# Patient Record
Sex: FEMALE | Race: WHITE | NOT HISPANIC OR LATINO | Employment: STUDENT | URBAN - METROPOLITAN AREA
[De-identification: names, ages, dates, MRNs, and addresses within clinical notes are randomized per-mention and may not be internally consistent; named-entity substitution may affect disease eponyms.]

---
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
## 2024-01-12 ENCOUNTER — APPOINTMENT (OUTPATIENT)
Dept: BEHAVIORAL HEALTH | Facility: CLINIC | Age: 19
End: 2024-01-12
Payer: COMMERCIAL

## 2024-01-17 ENCOUNTER — TELEMEDICINE (OUTPATIENT)
Dept: BEHAVIORAL HEALTH | Facility: CLINIC | Age: 19
End: 2024-01-17
Payer: COMMERCIAL

## 2024-01-17 DIAGNOSIS — F90.0 ATTENTION DEFICIT HYPERACTIVITY DISORDER (ADHD), PREDOMINANTLY INATTENTIVE TYPE: ICD-10-CM

## 2024-01-17 PROCEDURE — 99212 OFFICE O/P EST SF 10 MIN: CPT | Performed by: PSYCHIATRY & NEUROLOGY

## 2024-01-17 RX ORDER — ATOMOXETINE 80 MG/1
80 CAPSULE ORAL DAILY
Qty: 30 CAPSULE | Refills: 2 | Status: SHIPPED | OUTPATIENT
Start: 2024-01-17 | End: 2024-02-20 | Stop reason: SDUPTHER

## 2024-01-17 RX ORDER — DEXTROAMPHETAMINE SACCHARATE, AMPHETAMINE ASPARTATE, DEXTROAMPHETAMINE SULFATE AND AMPHETAMINE SULFATE 1.25; 1.25; 1.25; 1.25 MG/1; MG/1; MG/1; MG/1
5 TABLET ORAL 2 TIMES DAILY
Qty: 60 TABLET | Refills: 0 | Status: SHIPPED | OUTPATIENT
Start: 2024-01-17 | End: 2024-02-20 | Stop reason: SDUPTHER

## 2024-01-17 ASSESSMENT — ENCOUNTER SYMPTOMS: DECREASED CONCENTRATION: 1

## 2024-01-17 NOTE — ASSESSMENT & PLAN NOTE
Will add Adderall 5 mg up to twice daily as needed when she needs to focus for per more prolonged periods of time Strattera will continue to be an every day treatment for ADHD.  This will be 80 mg Strattera.  Patient to keep her meds safeguarded as she has roommates and is in a college environment.  Follow-up 4 weeks

## 2024-01-17 NOTE — PROGRESS NOTES
Subjective   Patient ID: Abby Jackson is a 18 y.o. female who presents for follow-up medication management for ADHD..  HPI there has been improvement with Strattera 80 mg daily.  However the patient still has difficulty focusing for more prolonged periods of time, or if she has to a project or needs to study.  She is getting dry mouth and headache as a side effect of Strattera especially if she takes it on an empty stomach.    Review of Systems   Psychiatric/Behavioral:  Positive for decreased concentration.        Objective   Physical Exam  Psychiatric:         Attention and Perception: Attention and perception normal.         Mood and Affect: Mood and affect normal.         Speech: Speech normal.         Behavior: Behavior normal. Behavior is cooperative.         Thought Content: Thought content normal.         Cognition and Memory: Cognition and memory normal.         Judgment: Judgment normal.         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  This will be 80 mg Strattera.  Patient to keep her meds safeguarded as she has roommates and is in a college environment.  Follow-up 4 weeks         Relevant Medications    amphetamine-dextroamphetamine (AdderalL) 5 mg tablet    atomoxetine (Strattera) 80 mg capsule            Fernando Rodriguez MD 01/17/24 5:42 PM

## 2024-02-14 ENCOUNTER — APPOINTMENT (OUTPATIENT)
Dept: BEHAVIORAL HEALTH | Facility: CLINIC | Age: 19
End: 2024-02-14
Payer: COMMERCIAL

## 2024-02-20 ENCOUNTER — TELEMEDICINE (OUTPATIENT)
Dept: BEHAVIORAL HEALTH | Facility: CLINIC | Age: 19
End: 2024-02-20
Payer: COMMERCIAL

## 2024-02-20 DIAGNOSIS — F90.0 ATTENTION DEFICIT HYPERACTIVITY DISORDER (ADHD), PREDOMINANTLY INATTENTIVE TYPE: ICD-10-CM

## 2024-02-20 PROCEDURE — 99212 OFFICE O/P EST SF 10 MIN: CPT | Performed by: PSYCHIATRY & NEUROLOGY

## 2024-02-20 PROCEDURE — 1036F TOBACCO NON-USER: CPT | Performed by: PSYCHIATRY & NEUROLOGY

## 2024-02-20 RX ORDER — ATOMOXETINE 80 MG/1
80 CAPSULE ORAL DAILY
Qty: 30 CAPSULE | Refills: 2 | Status: SHIPPED | OUTPATIENT
Start: 2024-02-20 | End: 2024-05-20

## 2024-02-20 RX ORDER — DEXTROAMPHETAMINE SACCHARATE, AMPHETAMINE ASPARTATE, DEXTROAMPHETAMINE SULFATE AND AMPHETAMINE SULFATE 1.25; 1.25; 1.25; 1.25 MG/1; MG/1; MG/1; MG/1
5 TABLET ORAL 2 TIMES DAILY
Qty: 60 TABLET | Refills: 0 | Status: SHIPPED | OUTPATIENT
Start: 2024-02-20 | End: 2025-02-19

## 2024-02-20 ASSESSMENT — ENCOUNTER SYMPTOMS: PSYCHIATRIC NEGATIVE: 1

## 2024-02-20 NOTE — PROGRESS NOTES
Subjective   Patient ID: Abby Jackson is a 18 y.o. female who presents for medication management visit.  HPI at last visit patient was started on Adderall 5 mg up to twice daily when she needs to focus.  This was based on the Strattera 80 mg dose being the maximum tolerable for her at that time was having severe dry mouth and headaches.  The dry mouth and headaches have alleviated over the last month significantly.  Patient is using the Adderall 5 mg at on an average once daily and gets about 2 to 3 hours duration of effect with it.  No side effects from the medication no crash when it wears off, not affecting his sleep onset.    Review of Systems   Psychiatric/Behavioral: Negative.         Objective   Physical Exam  Psychiatric:         Attention and Perception: Attention and perception normal.         Mood and Affect: Mood and affect normal.         Speech: Speech normal.         Behavior: Behavior normal. Behavior is cooperative.         Thought Content: Thought content normal.         Cognition and Memory: Cognition and memory normal.         Judgment: Judgment normal.         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  5 mg Adderall booster dose up to twice daily for prolonged study.  Periods follow-up 2 months         Relevant Medications    amphetamine-dextroamphetamine (AdderalL) 5 mg tablet    atomoxetine (Strattera) 80 mg capsule            Fernando Rodriguez MD 02/20/24 12:12 PM

## 2024-02-20 NOTE — ASSESSMENT & PLAN NOTE
Continue Strattera 80 mg daily as an every day medication.  5 mg Adderall booster dose up to twice daily for prolonged study.  Periods follow-up 2 months

## 2024-05-07 ENCOUNTER — APPOINTMENT (OUTPATIENT)
Dept: BEHAVIORAL HEALTH | Facility: CLINIC | Age: 19
End: 2024-05-07
Payer: COMMERCIAL

## 2024-05-22 ENCOUNTER — APPOINTMENT (OUTPATIENT)
Dept: BEHAVIORAL HEALTH | Facility: CLINIC | Age: 19
End: 2024-05-22
Payer: COMMERCIAL

## 2024-06-13 ENCOUNTER — APPOINTMENT (OUTPATIENT)
Dept: BEHAVIORAL HEALTH | Facility: CLINIC | Age: 19
End: 2024-06-13
Payer: COMMERCIAL

## 2024-06-13 DIAGNOSIS — F90.0 ATTENTION DEFICIT HYPERACTIVITY DISORDER (ADHD), PREDOMINANTLY INATTENTIVE TYPE: ICD-10-CM

## 2024-06-13 PROCEDURE — 99213 OFFICE O/P EST LOW 20 MIN: CPT | Performed by: PSYCHIATRY & NEUROLOGY

## 2024-06-13 RX ORDER — DEXTROAMPHETAMINE SACCHARATE, AMPHETAMINE ASPARTATE, DEXTROAMPHETAMINE SULFATE AND AMPHETAMINE SULFATE 1.25; 1.25; 1.25; 1.25 MG/1; MG/1; MG/1; MG/1
5 TABLET ORAL 2 TIMES DAILY
Qty: 60 TABLET | Refills: 0 | Status: SHIPPED | OUTPATIENT
Start: 2024-06-13 | End: 2025-06-13

## 2024-06-13 RX ORDER — ATOMOXETINE 60 MG/1
60 CAPSULE ORAL DAILY
Qty: 30 CAPSULE | Refills: 11 | Status: SHIPPED | OUTPATIENT
Start: 2024-06-13 | End: 2025-06-13

## 2024-06-13 ASSESSMENT — ENCOUNTER SYMPTOMS: DECREASED CONCENTRATION: 1

## 2024-06-13 NOTE — ASSESSMENT & PLAN NOTE
Reduce Strattera to 60 mg daily to reduce daily headaches.  Refilled Adderall 5 mg twice daily when she needs to focus.  Will be going back to school in December.

## 2024-06-13 NOTE — PROGRESS NOTES
Subjective   Patient ID: Abby Jackson is a 19 y.o. female who presents for medication management visit.  HPI patient taking Strattera 80 mg daily for attention deficit disorder.  On occasion will take Adderall 5 mg when she needs to focus.  She is working at [employer] job right now which is not ideal.  She has daily headaches which are increased from prior to when she took Strattera.  We know that her headaches were much worse at higher Strattera doses then 80 mg but it appears that she has significant headaches  at the 80 mg dose as well.    Review of Systems   Psychiatric/Behavioral:  Positive for decreased concentration.        Objective   Physical Exam  Psychiatric:         Attention and Perception: Attention and perception normal.         Mood and Affect: Mood and affect normal.         Speech: Speech normal.         Behavior: Behavior normal. Behavior is cooperative.         Thought Content: Thought content normal.         Cognition and Memory: Cognition and memory normal.         Judgment: Judgment normal.         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  Refilled Adderall 5 mg twice daily when she needs to focus.  Will be going back to school in December.         Relevant Medications    atomoxetine (Strattera) 60 mg capsule    amphetamine-dextroamphetamine (AdderalL) 5 mg tablet            Fernando Rodriguez MD 06/13/24 5:42 PM

## 2024-07-05 ENCOUNTER — APPOINTMENT (OUTPATIENT)
Dept: PRIMARY CARE | Facility: CLINIC | Age: 19
End: 2024-07-05
Payer: COMMERCIAL

## 2024-07-22 ASSESSMENT — PROMIS GLOBAL HEALTH SCALE
RATE_PHYSICAL_HEALTH: FAIR
RATE_SOCIAL_SATISFACTION: VERY GOOD
CARRYOUT_PHYSICAL_ACTIVITIES: COMPLETELY
EMOTIONAL_PROBLEMS: OFTEN
RATE_MENTAL_HEALTH: GOOD
RATE_GENERAL_HEALTH: GOOD
RATE_QUALITY_OF_LIFE: GOOD
RATE_AVERAGE_PAIN: 3
CARRYOUT_SOCIAL_ACTIVITIES: VERY GOOD
RATE_AVERAGE_FATIGUE: SEVERE

## 2024-07-24 ENCOUNTER — APPOINTMENT (OUTPATIENT)
Dept: PRIMARY CARE | Facility: CLINIC | Age: 19
End: 2024-07-24
Payer: COMMERCIAL

## 2024-07-24 VITALS
DIASTOLIC BLOOD PRESSURE: 78 MMHG | HEIGHT: 60 IN | TEMPERATURE: 98.2 F | BODY MASS INDEX: 27.17 KG/M2 | HEART RATE: 130 BPM | SYSTOLIC BLOOD PRESSURE: 113 MMHG | WEIGHT: 138.4 LBS

## 2024-07-24 DIAGNOSIS — Z30.011 INITIATION OF ORAL CONTRACEPTION: ICD-10-CM

## 2024-07-24 DIAGNOSIS — Z11.3 SCREENING EXAMINATION FOR STI: ICD-10-CM

## 2024-07-24 DIAGNOSIS — Z00.00 ADULT GENERAL MEDICAL EXAM: Primary | ICD-10-CM

## 2024-07-24 DIAGNOSIS — Z78.9 VEGETARIAN DIET: ICD-10-CM

## 2024-07-24 DIAGNOSIS — Z11.3 ROUTINE SCREENING FOR STI (SEXUALLY TRANSMITTED INFECTION): ICD-10-CM

## 2024-07-24 DIAGNOSIS — Z32.00 ENCOUNTER FOR PREGNANCY TEST, RESULT UNKNOWN: ICD-10-CM

## 2024-07-24 PROBLEM — J03.90 TONSILLITIS: Status: RESOLVED | Noted: 2023-10-22 | Resolved: 2024-07-24

## 2024-07-24 LAB — PREGNANCY TEST URINE, POC: NEGATIVE

## 2024-07-24 PROCEDURE — 81025 URINE PREGNANCY TEST: CPT | Performed by: FAMILY MEDICINE

## 2024-07-24 PROCEDURE — 87591 N.GONORRHOEAE DNA AMP PROB: CPT

## 2024-07-24 PROCEDURE — 87491 CHLMYD TRACH DNA AMP PROBE: CPT

## 2024-07-24 PROCEDURE — 99395 PREV VISIT EST AGE 18-39: CPT | Performed by: FAMILY MEDICINE

## 2024-07-24 PROCEDURE — 3008F BODY MASS INDEX DOCD: CPT | Performed by: FAMILY MEDICINE

## 2024-07-24 ASSESSMENT — ENCOUNTER SYMPTOMS
DYSPHORIC MOOD: 0
PALPITATIONS: 0
NERVOUS/ANXIOUS: 0
SHORTNESS OF BREATH: 0
SLEEP DISTURBANCE: 0
DYSURIA: 0
CHEST TIGHTNESS: 0
HEADACHES: 0

## 2024-07-24 ASSESSMENT — PATIENT HEALTH QUESTIONNAIRE - PHQ9
1. LITTLE INTEREST OR PLEASURE IN DOING THINGS: NOT AT ALL
SUM OF ALL RESPONSES TO PHQ9 QUESTIONS 1 AND 2: 0
2. FEELING DOWN, DEPRESSED OR HOPELESS: NOT AT ALL

## 2024-07-24 NOTE — PROGRESS NOTES
Subjective   Patient ID: Abby Jackson is a 19 y.o. female who presents for Annual Exam.    HPI   Nutrition: balanced diet, variety of fruits/veg, occasional dairy, plant based/eggs,water, 1 c coffee  Exercise: walks, strength training  Sleep: 6-7hrs/night  Eye: utd/ contact lneses  Dental:utd    Menarche: 13 yo  LMP approx 7/10/24  Regular cycles.  Interested in contraceptive options.  Denies migraine headaches with aura or familyOr personal history of hypercoagulable state.  Review of Systems   Respiratory:  Negative for chest tightness and shortness of breath.    Cardiovascular:  Negative for chest pain, palpitations and leg swelling.   Genitourinary:  Positive for menstrual problem (cramping). Negative for dysuria and vaginal bleeding.   Neurological:  Negative for headaches.   Psychiatric/Behavioral:  Negative for dysphoric mood and sleep disturbance. The patient is not nervous/anxious.        Objective   /78 (BP Location: Left arm, Patient Position: Sitting, BP Cuff Size: Adult)   Pulse (!) 130   Temp 36.8 °C (98.2 °F)   Ht 1.524 m (5')   Wt 62.8 kg (138 lb 6.4 oz)   BMI 27.03 kg/m²     Physical Exam  Vitals reviewed.   Constitutional:       General: She is not in acute distress.     Appearance: Normal appearance.   HENT:      Head: Normocephalic and atraumatic.      Right Ear: Tympanic membrane and ear canal normal.      Left Ear: Tympanic membrane and ear canal normal.      Nose: Nose normal. No congestion or rhinorrhea.      Mouth/Throat:      Mouth: Mucous membranes are moist.      Pharynx: Oropharynx is clear.   Eyes:      Extraocular Movements: Extraocular movements intact.      Conjunctiva/sclera: Conjunctivae normal.      Pupils: Pupils are equal, round, and reactive to light.   Cardiovascular:      Rate and Rhythm: Normal rate and regular rhythm.      Pulses: Normal pulses.      Heart sounds: Normal heart sounds. No murmur heard.  Pulmonary:      Effort: Pulmonary effort is normal. No  respiratory distress.      Breath sounds: Normal breath sounds.   Abdominal:      General: Abdomen is flat. Bowel sounds are normal.      Palpations: Abdomen is soft.      Tenderness: There is no abdominal tenderness.   Musculoskeletal:         General: Normal range of motion.      Cervical back: Normal range of motion and neck supple.   Lymphadenopathy:      Cervical: No cervical adenopathy.   Skin:     General: Skin is warm and dry.   Neurological:      General: No focal deficit present.      Mental Status: She is alert and oriented to person, place, and time.   Psychiatric:         Mood and Affect: Mood normal.         Thought Content: Thought content normal.         Assessment/Plan   Diagnoses and all orders for this visit:  Adult general medical exam.  Encouraged healthy diet, minimizing processed foods, adequate fresh fruits and vegetables, calcium intake  Continue regular exercise  Initiation of oral contraception  Reviewed contraceptive options in detail.  She wished to start combined oral contraceptive pill.  Reviewed proper use, notes duration, potential adverse effects.  -     norethindrone-e.estradioL-iron (Loestrin 24 FE) 1 mg-20 mcg (24)/75 mg (4) tablet; Take 1 tablet by mouth once daily.  -     POCT pregnancy, urine manually resulted  Screening examination for STI  -     HIV 1/2 Antigen/Antibody Screen with Reflex to Confirmation; Future  -     Hepatitis C Antibody; Future  -     Syphilis Screen with Reflex; Future  -     C. Trachomatis / N. Gonorrhoeae, Amplified Detection  Vegetarian diet  -     CBC and Auto Differential; Future  -     Vitamin B12; Future  Routine screening for STI (sexually transmitted infection)  -     HIV 1/2 Antigen/Antibody Screen with Reflex to Confirmation; Future  -     Hepatitis C Antibody; Future  -     Syphilis Screen with Reflex; Future  -     C. Trachomatis / N. Gonorrhoeae, Amplified Detection  Encounter for pregnancy test, result unknown  -     POCT pregnancy, urine  manually resulted  Other orders  -     Follow Up In Primary Care - Established; Future

## 2024-07-25 LAB
C TRACH RRNA SPEC QL NAA+PROBE: NEGATIVE
N GONORRHOEA DNA SPEC QL PROBE+SIG AMP: NEGATIVE

## 2024-08-23 ENCOUNTER — APPOINTMENT (OUTPATIENT)
Dept: PRIMARY CARE | Facility: CLINIC | Age: 19
End: 2024-08-23
Payer: COMMERCIAL

## 2024-09-03 DIAGNOSIS — R53.83 FATIGUE, UNSPECIFIED TYPE: Primary | ICD-10-CM

## 2024-09-03 DIAGNOSIS — E53.8 B12 DEFICIENCY: ICD-10-CM

## 2024-10-15 ENCOUNTER — TELEMEDICINE (OUTPATIENT)
Dept: BEHAVIORAL HEALTH | Facility: CLINIC | Age: 19
End: 2024-10-15
Payer: COMMERCIAL

## 2024-10-15 DIAGNOSIS — F06.4 ANXIETY DISORDER DUE TO KNOWN PHYSIOLOGICAL CONDITION: ICD-10-CM

## 2024-10-15 DIAGNOSIS — F90.9 ATTENTION DEFICIT HYPERACTIVITY DISORDER (ADHD), UNSPECIFIED ADHD TYPE: ICD-10-CM

## 2024-10-15 PROCEDURE — 99214 OFFICE O/P EST MOD 30 MIN: CPT | Performed by: NURSE PRACTITIONER

## 2024-10-15 RX ORDER — HYDROXYZINE HYDROCHLORIDE 10 MG/1
10 TABLET, FILM COATED ORAL 3 TIMES DAILY PRN
Qty: 90 TABLET | Refills: 0 | Status: SHIPPED | OUTPATIENT
Start: 2024-10-15 | End: 2024-11-14

## 2024-10-15 NOTE — PROGRESS NOTES
"Time in: 1015  Time out: 1027    An interactive audio and video telecommunication system which permits real time communications between the patient (at the originating site) and provider (at the distant site) was utilized to provide this telehealth service.   Verbal consent was requested and obtained from Abby Jackson on this date, 10/15/24 for a telehealth visit.      The patient verified date of birth, address and phone number.     Preferred Name:  Abby    Chief Complaint  \"I was just having some symptoms that I think are PMDD related and they are getting worse in the last several months.\"      History of Present Illness:  Abby Jackson is a 19 y.o. female patient with a chief complaint of medication management presenting to outpatient treatment for a scheduled psych outpatient psychiatric follow-up.    The patient reports, \"I was just having some symptoms that I think are PMDD related and they are getting worse in the last several months.\" She reports that she has been having mood swings and irritability around her period. She reports that she has been dating someone for the past 6 months and around her period, she does not want her boyfriend touching her. She reports crying a lot. She reports that it usually occurs the week before and the week of the symptoms. She reports that this has been happening in the past several months. She reports that she has been on birth control for the past 2 months. She reports that she has been dealing with similar symptoms for the past 4 years but that it was not an issue because she wasn't dating anyone before. She reports that she has difficulty falling asleep. She reports that this does not occur every night.  She denies any recent suicidal ideation.  She is currently at school at [employer] studying material science engineering and states that she enjoys this.  She denies any current issues related to ADHD.  She is compliant with Strattera 60 mg daily and Adderall 5 mg " "twice daily as needed.        Record Review: brief      Mental Status Exam:  General appearance: Appears state age, appropriate eye contact, adequate grooming and hygiene  Attitude: Calm, cooperative, and engaged in conversation.  Behavior: Appropriate eye contact.   Motor Activity: No psychomotor agitation or retardation. No abnormal movements, tremors or tics. No evidence of extrapyramidal symptoms or tardive dyskinesia.  Speech: Regular rate, rhythm, volume. Spontaneous, no pressured speech.  Mood: \"Okay.\"  Affect: Appropriate, full range, mood congruent.  Thought Process: Linear, logical, and goal-directed. No loose associations or gross thought disorganization.  Thought Content: Denied current suicidal ideation or thoughts of harm to self, denied homicidal ideation or thoughts of harm to others. No delusional thinking elicited. No perseverations or obsessions identified.   Perception: Did not endorse auditory or visual hallucinations, did not appear to be responding to hallucinatory stimuli.   Cognition: Alert, oriented x3. Preserved attention span and concentration, recent and remote memory. Adequate fund of knowledge. No deficits in language.   Insight: Good, in regards to understanding mental health condition  Judgement: Good      Review of Systems  Eyes  no discharge, no pain  Ears, Nose, Mouth, Throat  no pain, no rhinorrhea, no dysphagia  Resp  no dyspnea, no cough  GI  no nausea, vomiting, diarrhea    no urgency, no dysuria  Muskuloskeletal  no pain, no weakness  Integumentary  no rash  Endocrine   no polyurea  Hematologic  no bruising, no bleeding problems  CV  no palpitations, no pain  Pulm  no chest pain  Neuro  no weakness, no coordination problems, no dizziness  Constitutional  energy, appetite normal  Psychiatric  see psychiatric review of systems and HPI        Vitals:  There were no vitals filed for this visit.        OARRS:  Aicha Howard, MICHAEL-EDSON on 10/15/2024 10:14 AM  I have personally " reviewed the OARRS report for Abby Jackson. I have considered the risks of abuse, dependence, addiction and diversion    Is the patient prescribed a combination of a benzodiazepine and opioid?  No    Last Urine Drug Screen / ordered today: No  No results found for this or any previous visit (from the past 8760 hour(s)).  N/A    Clinical rationale for not completing a Urine Drug Screen: Patient of Dr. Rodriguez; seeing patient as a bridge appointment      Controlled Substance Agreement:  Patient of Dr. Rodriguez; seeing patient as a bridge appointment          Current Medications  Current Outpatient Medications on File Prior to Visit   Medication Sig Dispense Refill    amphetamine-dextroamphetamine (AdderalL) 5 mg tablet Take 1 tablet (5 mg) by mouth 2 times a day. 60 tablet 0    atomoxetine (Strattera) 60 mg capsule Take 1 capsule (60 mg) by mouth once daily. Swallow capsule whole; do not open. If opened accidentally, do not touch eyes; wash hands immediately (product is an eye irritant). 30 capsule 11    clindamycin-benzoyl peroxide (BenzacLIN) gel Apply topically once daily in the morning.  apply to face      norethindrone-e.estradioL-iron (Loestrin 24 FE) 1 mg-20 mcg (24)/75 mg (4) tablet Take 1 tablet by mouth once daily. 84 tablet 1    tretinoin (Retin-A) 0.05 % cream Apply topically once daily at bedtime.  apply to face       No current facility-administered medications on file prior to visit.         MEDICAL-DECISION MAKING  Moderate: 1 or more chronic illnesses with exacerbation, progression, or side effects of treatment with Prescription drug management          IMPRESSION  This is a 19-year-old female who presents for a follow-up appointment/bridge appointment for Dr. Rodriguez regarding anxiety and mood symptoms that correlate with the patient's menstrual cycle.  The patient reports that she has mood swings and irritability the week before her menses and the week of her menses.  The patient feels these symptoms  have been going on for about 4 years but that they had not become an issue until recently since she has started dating somebody in the last 6 months.  She also notes that she started oral birth control 2 months ago.  This writer encouraged the patient to discuss possible hormonal impact with her PCP who is currently prescribing her oral contraceptive.  She is also agreeable to trialing as needed hydroxyzine to help with irritability and anxiety around the time of her menses.  At this time, this writer is not going to order a scheduled medication as the symptoms seem to only correlate with her menses.  If needed, an SSRI can be added such as Zoloft or Prozac to help manage symptoms if the hydroxyzine is ineffective.  The patient is agreeable to following up with this writer in 4 weeks to evaluate the effectiveness of the hydroxyzine.  The patient also has an appointment scheduled with her PCP on 11/27/2024 at which time she will discuss her birth control.      Diagnoses and all orders for this visit:  Attention deficit hyperactivity disorder (ADHD), unspecified ADHD type  Anxiety disorder due to known physiological condition         Diagnoses  Problem List Items Addressed This Visit    None  Visit Diagnoses       Attention deficit hyperactivity disorder (ADHD), unspecified ADHD type        Anxiety disorder due to known physiological condition                    Risk Assessment  Acute risk for suicide: Low  Chronic risk for suicide: Low        PLAN  -Continue Adderall 5 mg by mouth twice daily as needed and Strattera 60 mg by mouth daily for ADHD; patient reports having adequate supply at this time and does not need refills  -Trial hydroxyzine 10 mg by mouth 3 times daily as needed for anxiety/irritability; prescription sent for hydroxyzine 10 mg by mouth 3 times daily as needed for anxiety, dispense 90 with no refills  -Follow-up with this provider on 11/12/2024  -Risks/benefits/assessment of medication interventions  discussed with pt; pt agreeable to plan. Will continue to monitor for symptoms management and side effects and adjust plan as needed.  -Motivational interviewing to increase coping skills/behavior regulation.  -Call  Psychiatry at [phone] or communicate through [employer] with issues .   -For [facility], [employer] is a 24/7 hotline you can call for assistance at (240) 058-2347. Please call 911 or go to your closest Emergency Room if you feel worse. This includes thoughts of hurting yourself or anyone else, or having other troubles such as hearing voices, seeing visions, or having new and scary thoughts about the people around you.    Review with patient: Treatment plan reviewed with the patient.  Medication risks/benefit reviewed with the patient      Time Spent:    Prep time: 2 minutes  Direct patient time: 12 minutes  Documentation time: 7 minutes  Total time: 21 minutes    MICHAEL Spring-CNP

## 2024-11-12 ENCOUNTER — APPOINTMENT (OUTPATIENT)
Dept: BEHAVIORAL HEALTH | Facility: CLINIC | Age: 19
End: 2024-11-12
Payer: COMMERCIAL

## 2024-11-12 DIAGNOSIS — F06.4 ANXIETY DISORDER DUE TO KNOWN PHYSIOLOGICAL CONDITION: ICD-10-CM

## 2024-11-12 DIAGNOSIS — F90.9 ATTENTION DEFICIT HYPERACTIVITY DISORDER (ADHD), UNSPECIFIED ADHD TYPE: ICD-10-CM

## 2024-11-12 PROCEDURE — 99214 OFFICE O/P EST MOD 30 MIN: CPT | Performed by: NURSE PRACTITIONER

## 2024-11-12 RX ORDER — ESCITALOPRAM OXALATE 5 MG/1
5 TABLET ORAL DAILY
Qty: 90 TABLET | Refills: 0 | Status: SHIPPED | OUTPATIENT
Start: 2024-11-12 | End: 2025-02-10

## 2024-11-12 RX ORDER — DEXTROAMPHETAMINE SACCHARATE, AMPHETAMINE ASPARTATE, DEXTROAMPHETAMINE SULFATE AND AMPHETAMINE SULFATE 1.25; 1.25; 1.25; 1.25 MG/1; MG/1; MG/1; MG/1
5 TABLET ORAL 2 TIMES DAILY PRN
Qty: 60 TABLET | Refills: 0 | Status: SHIPPED | OUTPATIENT
Start: 2024-11-12 | End: 2024-12-12

## 2024-11-12 NOTE — PROGRESS NOTES
"Time in: 1146  Time out: 1155    An interactive audio and video telecommunication system which permits real time communications between the patient (at the originating site) and provider (at the distant site) was utilized to provide this telehealth service.   Verbal consent was requested and obtained from Abby Jackson on this date, 11/12/24 for a telehealth visit.      The patient verified date of birth, address and phone number.     Preferred Name:  Abby    Chief Complaint  \"I think pretty good\"      History of Present Illness:  Abby Jackson is a 19 y.o. female patient with a chief complaint of medication management presenting to outpatient treatment for a scheduled psych outpatient psychiatric follow-up.    The patient reports, \"I think pretty good\" when asked how she is doing. She reports that the hydroxyzine is helpful but that she still feel sad. She reports that she is sleeping better also. She reports that the sadness is still present the week prior to her menses and a few days into her menses. She reports that she is open to taking an antidepressant to help with the sadness. She reports that she uses the hydroxyzine about twice daily for the two weeks around her menses but not at all during the other 2 weeks. She reports that she has not been taking the Strattera because she thought that they were causing headaches but she still has the headaches. She repots that she is fine with taking the Adderall as needed. She reports that she is taking the Adderall about 4-5 days per week and between 1-2 doses per day. She reports that school is good.         Record Review: brief      Mental Status Exam:  General appearance: Appears state age, appropriate eye contact, adequate grooming and hygiene  Attitude: Calm, cooperative, and engaged in conversation.  Behavior: Appropriate eye contact.   Motor Activity: No psychomotor agitation or retardation. No abnormal movements, tremors or tics. No evidence of extrapyramidal " "symptoms or tardive dyskinesia.  Speech: Regular rate, rhythm, volume. Spontaneous, no pressured speech.  Mood: \"Pretty good.\"   Affect: Appropriate, full range, mood congruent.  Thought Process: Linear, logical, and goal-directed. No loose associations or gross thought disorganization.  Thought Content: Denied current suicidal ideation or thoughts of harm to self, denied homicidal ideation or thoughts of harm to others. No delusional thinking elicited. No perseverations or obsessions identified.   Perception: Did not endorse auditory or visual hallucinations, did not appear to be responding to hallucinatory stimuli.   Cognition: Alert, oriented x3. Preserved attention span and concentration, recent and remote memory. Adequate fund of knowledge. No deficits in language.   Insight: Good, in regards to understanding mental health condition  Judgement: Good        Review of Systems  Eyes  no discharge, no pain  Ears, Nose, Mouth, Throat  no pain, no rhinorrhea, no dysphagia  Resp  no dyspnea, no cough  GI  no nausea, vomiting, diarrhea    no urgency, no dysuria  Muskuloskeletal  no pain, no weakness  Integumentary  no rash  Endocrine   no polyurea  Hematologic  no bruising, no bleeding problems  CV  no palpitations, no pain  Pulm  no chest pain  Neuro  no weakness, no coordination problems, no dizziness  Constitutional  energy, appetite normal  Psychiatric  see psychiatric review of systems and HPI        Vitals:  There were no vitals filed for this visit.          OARRS:  Aicha Howard, MICHAEL-EDSON on 11/12/2024 11:45 AM  I have personally reviewed the OARRS report for Abby Jackson. I have considered the risks of abuse, dependence, addiction and diversion    Is the patient prescribed a combination of a benzodiazepine and opioid?  No    Last Urine Drug Screen / ordered today: No  Recent Results   No results found for this or any previous visit (from the past 8760 hour(s)).     N/A     Clinical rationale for not " completing a Urine Drug Screen: Patient of Dr. Rodriguez; seeing patient as a bridge appointment        Controlled Substance Agreement:  Patient of Dr. Rodriguez; seeing patient as a bridge appointment          Current Medications  Current Outpatient Medications on File Prior to Visit   Medication Sig Dispense Refill    amphetamine-dextroamphetamine (AdderalL) 5 mg tablet Take 1 tablet (5 mg) by mouth 2 times a day. 60 tablet 0    atomoxetine (Strattera) 60 mg capsule Take 1 capsule (60 mg) by mouth once daily. Swallow capsule whole; do not open. If opened accidentally, do not touch eyes; wash hands immediately (product is an eye irritant). 30 capsule 11    clindamycin-benzoyl peroxide (BenzacLIN) gel Apply topically once daily in the morning.  apply to face      hydrOXYzine HCL (Atarax) 10 mg tablet Take 1 tablet (10 mg) by mouth 3 times a day as needed for anxiety. 90 tablet 0    norethindrone-e.estradioL-iron (Loestrin 24 FE) 1 mg-20 mcg (24)/75 mg (4) tablet Take 1 tablet by mouth once daily. 84 tablet 1    tretinoin (Retin-A) 0.05 % cream Apply topically once daily at bedtime.  apply to face       No current facility-administered medications on file prior to visit.         MEDICAL-DECISION MAKING  Moderate: 1 or more chronic illnesses with exacerbation, progression, or side effects of treatment with Prescription drug management          IMPRESSION  This is a 19-year-old female who presents for a second follow-up appointment/bridge appointment for Dr. Rodriguez regarding anxiety and mood symptoms that correlate with the patient's menstrual cycle. The patient reports that she has mood swings and irritability the week before her menses and the week of her menses.  the patient does note some improvement in anxiety and irritability but utilizing the as needed hydroxyzine for the 2 weeks where she experiences the mood change.  However, she states that she does still experience persistent sadness over those 2 weeks.  She  does express the desire to trial an antidepressant to help with these symptoms.  She is agreeable to starting Lexapro 5 mg daily.  She also notes that she started oral birth control 3 months ago. This writer encouraged the patient to discuss possible hormonal impact with her PCP who is currently prescribing her oral contraceptive and the patient has an appointment with her PCP coming up on 11/27/2024 at which time she will discuss her birth control.  She also states that she requires a refill for Adderall and that she stopped taking the Strattera because she thought it was causing headaches.  She states that she plans to discuss stopping the Strattera with Dr. Rodriguez when he returns in February 2025.  She utilizes the Adderall about 4-5 times per week, 1-2 times per day.  She denies any recent suicidal ideation.  She reports school is going well.  She is help seeking and future oriented.      Diagnoses and all orders for this visit:  Attention deficit hyperactivity disorder (ADHD), unspecified ADHD type  -     Follow Up In Psychiatry  Anxiety disorder due to known physiological condition  -     Follow Up In Psychiatry         Diagnoses  Problem List Items Addressed This Visit    None  Visit Diagnoses       Attention deficit hyperactivity disorder (ADHD), unspecified ADHD type        Anxiety disorder due to known physiological condition                    Risk Assessment  Acute risk for suicide: Low  Chronic risk for suicide: Low            PLAN  -Continue Adderall 5 mg by mouth twice daily as needed ; prescription sent for Adderall 5 mg by mouth twice daily as needed for ADHD symptoms, dispense 60 with no refills  -Hold Strattera 60 mg by mouth daily for ADHD as patient stopped taking it to concern that it was causing headache and plans to discuss this with Dr. Rodriguez when he returns in February 2025  -Continue hydroxyzine 10 mg by mouth 3 times daily as needed for anxiety/irritability; no prescription needed at  this time as patient has adequate supply  -Trial Lexapro 5 mg by mouth daily for anxiety and mood symptoms related to her menses; prescription sent for Lexapro 5 mg by mouth daily, dispensed 90 with no refills  -Follow-up with her provider in February 2025  -Risks/benefits/assessment of medication interventions discussed with pt; pt agreeable to plan. Will continue to monitor for symptoms management and side effects and adjust plan as needed.  -Motivational interviewing to increase coping skills/behavior regulation.  -Call  Psychiatry at [phone] or communicate through [employer] with issues .   -For [facility], [employer] is a 24/7 hotline you can call for assistance at (733) 896-0397. Please call 631 or go to your closest Emergency Room if you feel worse. This includes thoughts of hurting yourself or anyone else, or having other troubles such as hearing voices, seeing visions, or having new and scary thoughts about the people around you.    Review with patient: Treatment plan reviewed with the patient.  Medication risks/benefit reviewed with the patient      Time Spent:    Prep time: 2 minutes  Direct patient time: 9 minutes  Documentation time: 6 minutes  Total time: 17 minutes    MICHAEL Spring-CNP

## 2024-11-13 ENCOUNTER — APPOINTMENT (OUTPATIENT)
Dept: BEHAVIORAL HEALTH | Facility: CLINIC | Age: 19
End: 2024-11-13
Payer: COMMERCIAL

## 2024-11-27 ENCOUNTER — APPOINTMENT (OUTPATIENT)
Dept: PRIMARY CARE | Facility: CLINIC | Age: 19
End: 2024-11-27
Payer: COMMERCIAL

## 2024-11-27 VITALS
HEIGHT: 60 IN | TEMPERATURE: 96.6 F | WEIGHT: 138 LBS | BODY MASS INDEX: 27.09 KG/M2 | DIASTOLIC BLOOD PRESSURE: 75 MMHG | SYSTOLIC BLOOD PRESSURE: 123 MMHG | HEART RATE: 71 BPM

## 2024-11-27 DIAGNOSIS — Z30.41 SURVEILLANCE FOR BIRTH CONTROL, ORAL CONTRACEPTIVES: Primary | ICD-10-CM

## 2024-11-27 DIAGNOSIS — Z87.440 HISTORY OF RECURRENT UTIS: ICD-10-CM

## 2024-11-27 LAB
APPEARANCE UR: ABNORMAL
BACTERIA #/AREA URNS AUTO: ABNORMAL /HPF
BILIRUB UR STRIP.AUTO-MCNC: NEGATIVE MG/DL
COLOR UR: YELLOW
GLUCOSE UR STRIP.AUTO-MCNC: NORMAL MG/DL
HOLD SPECIMEN: NORMAL
KETONES UR STRIP.AUTO-MCNC: NEGATIVE MG/DL
LEUKOCYTE ESTERASE UR QL STRIP.AUTO: NEGATIVE
MUCOUS THREADS #/AREA URNS AUTO: ABNORMAL /LPF
NITRITE UR QL STRIP.AUTO: NEGATIVE
PH UR STRIP.AUTO: 6.5 [PH]
PROT UR STRIP.AUTO-MCNC: ABNORMAL MG/DL
RBC # UR STRIP.AUTO: ABNORMAL /UL
RBC #/AREA URNS AUTO: ABNORMAL /HPF
SP GR UR STRIP.AUTO: 1.02
SQUAMOUS #/AREA URNS AUTO: ABNORMAL /HPF
UROBILINOGEN UR STRIP.AUTO-MCNC: NORMAL MG/DL
WBC #/AREA URNS AUTO: ABNORMAL /HPF

## 2024-11-27 PROCEDURE — 3008F BODY MASS INDEX DOCD: CPT | Performed by: FAMILY MEDICINE

## 2024-11-27 PROCEDURE — 99214 OFFICE O/P EST MOD 30 MIN: CPT | Performed by: FAMILY MEDICINE

## 2024-11-27 PROCEDURE — 81001 URINALYSIS AUTO W/SCOPE: CPT

## 2024-11-27 PROCEDURE — 1036F TOBACCO NON-USER: CPT | Performed by: FAMILY MEDICINE

## 2024-11-27 NOTE — PROGRESS NOTES
Subjective   Patient ID: Abby Jackson is a 19 y.o. female who presents for Follow-up (Follow up states no concerns. ).    HPI   Here for follow-up since initiating oral contraceptive pills.  Reports compliance with daily use.  No missed doses.  Withdrawal bleed is light to moderate flow, lasting 2 to 3 days.  Denies any adverse effects including no headaches, mood changes, nausea, abdominal pain.  Since August, has had UTIs with symptoms of increased urgency and frequency of urination and dysuria.  Denies any hematuria.  No associated fevers, chills, nausea, or vomiting.  On each of these occasions has seen a telemedicine provider and received treatment with Macrobid.  No urinalysis or cultures have been obtained.  At her most recent telemedicine visit, the provider voiced concern about frequency of symptoms in the past 3 months.  Denies any new sexual partners.  Review of Systems  Per HPI  Objective   /75 (BP Location: Left arm, Patient Position: Sitting)   Pulse 71   Temp 35.9 °C (96.6 °F)   Ht 1.524 m (5')   Wt 62.6 kg (138 lb)   BMI 26.95 kg/m²     Physical Exam  Vitals reviewed.   Constitutional:       Appearance: Normal appearance. She is normal weight.   HENT:      Head: Normocephalic and atraumatic.      Mouth/Throat:      Mouth: Mucous membranes are moist.   Eyes:      Extraocular Movements: Extraocular movements intact.      Conjunctiva/sclera: Conjunctivae normal.   Cardiovascular:      Rate and Rhythm: Normal rate and regular rhythm.      Pulses: Normal pulses.      Heart sounds: Normal heart sounds. No murmur heard.  Pulmonary:      Effort: Pulmonary effort is normal.      Breath sounds: Normal breath sounds.   Abdominal:      General: Abdomen is flat. Bowel sounds are normal.      Palpations: Abdomen is soft.      Tenderness: There is no abdominal tenderness. There is no right CVA tenderness, left CVA tenderness or guarding.   Musculoskeletal:      Cervical back: Neck supple.      Right lower  leg: No edema.      Left lower leg: No edema.   Lymphadenopathy:      Cervical: No cervical adenopathy.   Skin:     General: Skin is warm and dry.   Neurological:      Mental Status: She is alert and oriented to person, place, and time. Mental status is at baseline.   Psychiatric:         Mood and Affect: Mood normal.         Behavior: Behavior normal.         Thought Content: Thought content normal.         Judgment: Judgment normal.         Assessment/Plan   Assessment & Plan  Surveillance for birth control, oral contraceptives  Continue OCP  Orders:    norethindrone-e.estradioL-iron (Loestrin 24 FE) 1 mg-20 mcg (24)/75 mg (4) tablet; Take 1 tablet by mouth once daily.    History of recurrent UTIs  Reviewed previous  chlamydia screening which was negative in July 2024.  Will obtain urinalysis today for baseline in asymptomatic state.  In addition, we discussed scheduling future telemedicine visit with myself if she has recurrence of symptoms and was also provided with future order for urinalysis in the event she has recurrence of symptoms.  We discussed that if urine culture is negative during acute symptoms, may need to consider testing for Ureaplasma and mycoplasma.  Encourage adequate hydration.  If continues to have documented UTIs, may need to consider prophylactic therapy.  Orders:    Urinalysis with Reflex Culture and Microscopic    Urinalysis with Reflex Culture and Microscopic; Future

## 2024-12-02 ENCOUNTER — TELEPHONE (OUTPATIENT)
Dept: PRIMARY CARE | Facility: CLINIC | Age: 19
End: 2024-12-02

## 2024-12-02 DIAGNOSIS — Z87.440 HISTORY OF RECURRENT UTIS: Primary | ICD-10-CM

## 2024-12-02 NOTE — TELEPHONE ENCOUNTER
----- Message from Suzan Mata sent at 11/29/2024  3:47 PM EST -----  Is a urine culture pending?  Urine micro shows 1 +bacteria

## 2024-12-02 NOTE — TELEPHONE ENCOUNTER
Spoke with Asia at the lab.  She advised that the urine did not reflex for a culture.  States that it did not meet the criteria to run a culture.  She spoke with her supervisor and advised that if a culture is needed that she will do one.  Please advise.

## 2024-12-05 DIAGNOSIS — Z87.440 HISTORY OF RECURRENT UTIS: Primary | ICD-10-CM

## 2024-12-23 ENCOUNTER — APPOINTMENT (OUTPATIENT)
Dept: BEHAVIORAL HEALTH | Facility: CLINIC | Age: 19
End: 2024-12-23
Payer: COMMERCIAL

## 2024-12-23 ENCOUNTER — LAB (OUTPATIENT)
Dept: LAB | Facility: LAB | Age: 19
End: 2024-12-23
Payer: COMMERCIAL

## 2024-12-23 DIAGNOSIS — F90.9 ATTENTION DEFICIT HYPERACTIVITY DISORDER (ADHD), UNSPECIFIED ADHD TYPE: ICD-10-CM

## 2024-12-23 DIAGNOSIS — Z87.440 HISTORY OF RECURRENT UTIS: ICD-10-CM

## 2024-12-23 DIAGNOSIS — F06.4 ANXIETY DISORDER DUE TO KNOWN PHYSIOLOGICAL CONDITION: ICD-10-CM

## 2024-12-23 LAB
APPEARANCE UR: ABNORMAL
BILIRUB UR STRIP.AUTO-MCNC: NEGATIVE MG/DL
COLOR UR: COLORLESS
GLUCOSE UR STRIP.AUTO-MCNC: NORMAL MG/DL
HOLD SPECIMEN: NORMAL
KETONES UR STRIP.AUTO-MCNC: NEGATIVE MG/DL
LEUKOCYTE ESTERASE UR QL STRIP.AUTO: NEGATIVE
NITRITE UR QL STRIP.AUTO: NEGATIVE
PH UR STRIP.AUTO: 6.5 [PH]
PROT UR STRIP.AUTO-MCNC: NEGATIVE MG/DL
RBC # UR STRIP.AUTO: NEGATIVE /UL
SP GR UR STRIP.AUTO: 1.02
UROBILINOGEN UR STRIP.AUTO-MCNC: NORMAL MG/DL

## 2024-12-23 PROCEDURE — 99214 OFFICE O/P EST MOD 30 MIN: CPT | Performed by: NURSE PRACTITIONER

## 2024-12-23 PROCEDURE — 81003 URINALYSIS AUTO W/O SCOPE: CPT

## 2024-12-23 NOTE — PROGRESS NOTES
"Time in: 1449  Time out: 1454    An interactive audio and video telecommunication system which permits real time communications between the patient (at the originating site) and provider (at the distant site) was utilized to provide this telehealth service.   Verbal consent was requested and obtained from Abby Jackson on this date, 12/23/24 for a telehealth visit.      The patient verified date of birth, address and phone number.     Preferred Name:  Abby    Chief Complaint  \"pretty good\"      History of Present Illness:  Abby Jackson is a 19 y.o. female patient with a chief complaint of medication management presenting to outpatient treatment for a scheduled psych outpatient psychiatric follow-up.    The patient reports, \"pretty good\" when asked how she is doing. She reports that she is currently on break from school. She reports that she graduates next year. She reports that her ADHD symptoms are well managed. She reports that she is using the Adderall about once per week. She reports that the Strattera has not been beneficial and she stopped taking it in September. She reports that the depression has been better, especially in the past week. She feels the anxiety and depression are more manageable with the Lexapro. She denies any suicidal ideation. She feels her symptoms are better managed around her menses.           Falls  History of falls: No  Have you fallen in the past 12 months: No      High Blood pressure  No      Depression Screening:   Better since starting Lexapro  Plan: Medication, Therapy, and Follow up with this writer      Anxiety Screening:  Better since starting Lexapro  Plan: Medication, Therapy, and Follow up with this writer        Record Review: brief      Mental Status Exam:  General appearance: Appears state age, appropriate eye contact, adequate grooming and hygiene  Attitude: Calm, cooperative, and engaged in conversation.  Behavior: Appropriate eye contact.   Motor Activity: No psychomotor " "agitation or retardation. No abnormal movements, tremors or tics. No evidence of extrapyramidal symptoms or tardive dyskinesia.  Speech: Regular rate, rhythm, volume. Spontaneous, no pressured speech.  Mood: \"Good.\"   Affect: Appropriate, full range, mood congruent.  Thought Process: Linear, logical, and goal-directed. No loose associations or gross thought disorganization.  Thought Content: Denied current suicidal ideation or thoughts of harm to self, denied homicidal ideation or thoughts of harm to others. No delusional thinking elicited. No perseverations or obsessions identified.   Perception: Did not endorse auditory or visual hallucinations, did not appear to be responding to hallucinatory stimuli.   Cognition: Alert, oriented x3. Preserved attention span and concentration, recent and remote memory. Adequate fund of knowledge. No deficits in language.   Insight: Good, in regards to understanding mental health condition  Judgement: Good      Review of Systems  Eyes  no discharge, no pain  Ears, Nose, Mouth, Throat  no pain, no rhinorrhea, no dysphagia  Resp  no dyspnea, no cough  GI  no nausea, vomiting, diarrhea    no urgency, no dysuria  Muskuloskeletal  no pain, no weakness  Integumentary  no rash  Endocrine   no polyurea  Hematologic  no bruising, no bleeding problems  CV  no palpitations, no pain  Pulm  no chest pain  Neuro  no weakness, no coordination problems, no dizziness  Constitutional  energy, appetite normal  Psychiatric  see psychiatric review of systems and HPI        Vitals:  There were no vitals filed for this visit.          OARRS:  Aicha Howard, MICHAEL-EDSON on 12/23/2024  2:47 PM  I have personally reviewed the OARRS report for Abby Jackson. I have considered the risks of abuse, dependence, addiction and diversion    Is the patient prescribed a combination of a benzodiazepine and opioid?  No    Last Urine Drug Screen / ordered today: no, plan to order at next appointment on 2/6/25  No " results found for this or any previous visit (from the past 8760 hours).  N/A      Controlled Substance Agreement:  Date of the Last Agreement: N/A; will discuss at next appointment on 2/6/25        Current Medications  Current Outpatient Medications on File Prior to Visit   Medication Sig Dispense Refill    amphetamine-dextroamphetamine (AdderalL) 5 mg tablet Take 1 tablet (5 mg) by mouth 2 times a day as needed (ADHD symptoms). 60 tablet 0    atomoxetine (Strattera) 60 mg capsule Take 1 capsule (60 mg) by mouth once daily. Swallow capsule whole; do not open. If opened accidentally, do not touch eyes; wash hands immediately (product is an eye irritant). 30 capsule 11    clindamycin-benzoyl peroxide (BenzacLIN) gel Apply topically once daily in the morning.  apply to face      escitalopram (Lexapro) 5 mg tablet Take 1 tablet (5 mg) by mouth once daily. 90 tablet 0    norethindrone-e.estradioL-iron (Loestrin 24 FE) 1 mg-20 mcg (24)/75 mg (4) tablet Take 1 tablet by mouth once daily. 84 tablet 3    tretinoin (Retin-A) 0.05 % cream Apply topically once daily at bedtime.  apply to face       No current facility-administered medications on file prior to visit.         MEDICAL-DECISION MAKING  Moderate: 2 or more stable chronic illnesses with Prescription drug management          IMPRESSION  This is a 19-year-old female who presents for follow-up for ADHD and anxiety/mood symptoms related to her menstrual cycle. The patient does note improvement in symptoms of depression and anxiety since starting Lexapro last month.  The patient has not been utilizing Adderall as frequently, reporting only needing it about once a week.  The patient has not taken Strattera since September and does not think the Strattera was helpful.  She would prefer to continue to utilize the Adderall as needed.  The patient does not require prescription at this time for the Adderall.  She denies any recent suicidal ideation.   She is help seeking and  future oriented.  She is agreeable to following up on 2/6/2025. Plan is to discussed with the patient about the controlled substance agreement, need for urine drug screen and in person appointment at appointment on 2/6/2025.      Diagnoses and all orders for this visit:  Attention deficit hyperactivity disorder (ADHD), unspecified ADHD type  Anxiety disorder due to known physiological condition         Diagnoses  Problem List Items Addressed This Visit    None  Visit Diagnoses       Attention deficit hyperactivity disorder (ADHD), unspecified ADHD type        Anxiety disorder due to known physiological condition                    Risk Assessment  Acute risk for suicide: Low  Chronic risk for suicide: Low          PLAN  -Continue Adderall 5 mg by mouth twice daily as needed; no prescription needed at this time; plan to discuss at next appointment controlled substance agreement, need for urine drug screen, need for in person appointment  -Discontinue Strattera due to ineffectiveness  -Continue hydroxyzine 10 mg by mouth 3 times daily as needed for anxiety/irritability; no prescription needed at this time as patient has adequate supply  -Continue Lexapro 5 mg by mouth daily for anxiety and mood symptoms related to her menses; no prescription needed at this time  -Follow-up with this writer on 2/6/2025  -Risks/benefits/assessment of medication interventions discussed with pt; pt agreeable to plan. Will continue to monitor for symptoms management and side effects and adjust plan as needed.  -Motivational interviewing to increase coping skills/behavior regulation.  -Call  Psychiatry at [phone] or communicate through [facility] with issues .   -For [facility] residents, [employer] is a 24/7 hotline you can call for assistance at [phone]. Please call 911 or go to your closest Emergency Room if you feel worse. This includes thoughts of hurting yourself or anyone else, or having other troubles such as  hearing voices, seeing visions, or having new and scary thoughts about the people around you.      Review with patient: Treatment plan reviewed with the patient.  Medication risks/benefit reviewed with the patient      Time Spent:    Prep time: 2 minutes  Direct patient time: 5 minutes  Documentation time: 6 minutes  Total time: 13 minutes    MICHAEL Spring-CNP

## 2024-12-27 ENCOUNTER — TELEPHONE (OUTPATIENT)
Dept: PRIMARY CARE | Facility: CLINIC | Age: 19
End: 2024-12-27
Payer: COMMERCIAL

## 2024-12-27 ENCOUNTER — LAB (OUTPATIENT)
Dept: LAB | Facility: LAB | Age: 19
End: 2024-12-27
Payer: COMMERCIAL

## 2024-12-27 DIAGNOSIS — Z87.440 HISTORY OF RECURRENT UTIS: ICD-10-CM

## 2024-12-27 DIAGNOSIS — Z87.440 HISTORY OF RECURRENT UTIS: Primary | ICD-10-CM

## 2024-12-27 PROCEDURE — 87109 MYCOPLASMA: CPT

## 2024-12-27 NOTE — TELEPHONE ENCOUNTER
----- Message from Clau VILLEGAS sent at 12/27/2024 11:39 AM EST -----  Yany from customer services called back and says that she talked both to Maddie & Libia at the [place] lab and have clarified the correct way to collect the canceled lab sample. She says that if you put the order back in and have the patient go back to the lab in [place] that they should be able to collect this lab properly.

## 2024-12-27 NOTE — TELEPHONE ENCOUNTER
New order placed.  Please contact patient and apologize for the need to return to the lab, please make sure she is aware to go to the [place] lab to ensure proper specimen collection.

## 2024-12-29 LAB — UREAPLASMA/MYCOPLASMA CULTURE: NORMAL

## 2025-01-01 LAB — UREAPLASMA/MYCOPLASMA CULTURE: NORMAL

## 2025-01-08 ENCOUNTER — PATIENT MESSAGE (OUTPATIENT)
Dept: PRIMARY CARE | Facility: CLINIC | Age: 20
End: 2025-01-08
Payer: COMMERCIAL

## 2025-01-08 ENCOUNTER — TELEPHONE (OUTPATIENT)
Dept: PRIMARY CARE | Facility: CLINIC | Age: 20
End: 2025-01-08
Payer: COMMERCIAL

## 2025-01-08 NOTE — TELEPHONE ENCOUNTER
Pt called to schedule an appointment for UTI symptoms.   Since our office is booked today she is opting to find an urgent-care close to her in [place] since she is at school

## 2025-02-03 ENCOUNTER — APPOINTMENT (OUTPATIENT)
Dept: BEHAVIORAL HEALTH | Facility: CLINIC | Age: 20
End: 2025-02-03
Payer: COMMERCIAL

## 2025-02-10 ENCOUNTER — APPOINTMENT (OUTPATIENT)
Dept: BEHAVIORAL HEALTH | Facility: CLINIC | Age: 20
End: 2025-02-10
Payer: COMMERCIAL

## 2025-02-10 DIAGNOSIS — F06.4 ANXIETY DISORDER DUE TO KNOWN PHYSIOLOGICAL CONDITION: ICD-10-CM

## 2025-02-10 DIAGNOSIS — F90.9 ATTENTION DEFICIT HYPERACTIVITY DISORDER (ADHD), UNSPECIFIED ADHD TYPE: ICD-10-CM

## 2025-02-10 DIAGNOSIS — F43.22 ADJUSTMENT DISORDER WITH ANXIOUS MOOD: ICD-10-CM

## 2025-02-10 PROCEDURE — 99214 OFFICE O/P EST MOD 30 MIN: CPT | Performed by: NURSE PRACTITIONER

## 2025-02-10 RX ORDER — ESCITALOPRAM OXALATE 10 MG/1
10 TABLET ORAL DAILY
Qty: 90 TABLET | Refills: 0 | Status: SHIPPED | OUTPATIENT
Start: 2025-02-10 | End: 2025-05-11

## 2025-02-10 RX ORDER — HYDROXYZINE HYDROCHLORIDE 10 MG/1
10 TABLET, FILM COATED ORAL 3 TIMES DAILY PRN
Qty: 90 TABLET | Refills: 0 | Status: SHIPPED | OUTPATIENT
Start: 2025-02-10 | End: 2025-03-12

## 2025-02-10 NOTE — PROGRESS NOTES
"Time in: 1403  Time out: 1412    An interactive audio and video telecommunication system which permits real time communications between the patient (at the originating site) and provider (at the distant site) was utilized to provide this telehealth service.   Verbal consent was requested and obtained from Abby Jackson on this date, 02/10/25 for a telehealth visit.      The patient verified date of birth, address and phone number.     Preferred Name:  Abby    Chief Complaint  \"They've been okay. I had the flu and then my boyfriend of about one year broke up with me.\"       History of Present Illness:  Abby Jackson is a 19 y.o. female patient with a chief complaint of medication management presenting to outpatient treatment for a scheduled psych outpatient psychiatric follow-up.    The patient reports, \"They've been okay. I had the flu and then my boyfriend of about one year broke up with me.\" She reports that she has 18 credit hours. She reports that she has been turning assignments in late due to everything. She reports that her anxiety has been increased since the break up. She reports that her sleep schedule has been \"weird\" because sometimes she will get 9 hours and other times not be able to fall asleep. She reports that she has been using the Adderall, about once or twice per week. She denies any suicidal ideation. She reports that she would be interested in starting therapy. She reports that she would be interested in increasing the Lexapro to help with her recent increase in anxiety due to recent stressors.  She is also agreeable to retrialing as needed hydroxyzine for breakthrough anxiety.      Falls  History of falls: No  Have you fallen in the past 12 months: No        High Blood pressure  No        Depression Screening:   Better since starting Lexapro  Plan: Medication, Therapy, and Follow up with this writer        Anxiety Screening:  Better since starting Lexapro  Plan: Medication, Therapy, and Follow up " "with this writer      Record Review: brief      Mental Status Exam:  General appearance: Appears state age, appropriate eye contact, adequate grooming and hygiene  Attitude: Calm, cooperative, and engaged in conversation.  Behavior: Appropriate eye contact.   Motor Activity: No psychomotor agitation or retardation. No abnormal movements, tremors or tics. No evidence of extrapyramidal symptoms or tardive dyskinesia.  Speech: Regular rate, rhythm, volume. Spontaneous, no pressured speech.  Mood: \"Fine.\"   Affect: Appropriate, full range, mood congruent.  Thought Process: Linear, logical, and goal-directed. No loose associations or gross thought disorganization.  Thought Content: Denied current suicidal ideation or thoughts of harm to self, denied homicidal ideation or thoughts of harm to others. No delusional thinking elicited. No perseverations or obsessions identified.   Perception: Did not endorse auditory or visual hallucinations, did not appear to be responding to hallucinatory stimuli.   Cognition: Alert, oriented x3. Preserved attention span and concentration, recent and remote memory. Adequate fund of knowledge. No deficits in language.   Insight: Good, in regards to understanding mental health condition  Judgement: Good        Review of Systems  Eyes  no discharge, no pain  Ears, Nose, Mouth, Throat  no pain, no rhinorrhea, no dysphagia  Resp  no dyspnea, no cough  GI  no nausea, vomiting, diarrhea    no urgency, no dysuria  Muskuloskeletal  no pain, no weakness  Integumentary  no rash  Endocrine   no polyurea  Hematologic  no bruising, no bleeding problems  CV  no palpitations, no pain  Pulm  no chest pain  Neuro  no weakness, no coordination problems, no dizziness  Constitutional  energy, appetite normal  Psychiatric  see psychiatric review of systems and HPI        Vitals:  There were no vitals filed for this visit.        OARRS:  Aicha Howard, MICHAEL-EDSON on 2/10/2025  2:01 PM  I have personally " reviewed the OARRS report for Abby Jackson. I have considered the risks of abuse, dependence, addiction and diversion    Is the patient prescribed a combination of a benzodiazepine and opioid?  No    Will discuss controlled substance agreement, drug screen, and in person appointment when her Adderall needs refilled.  She is only utilizing the Adderall 1-2 times per week.            Current Medications  Current Outpatient Medications on File Prior to Visit   Medication Sig Dispense Refill    clindamycin-benzoyl peroxide (BenzacLIN) gel Apply topically once daily in the morning.  apply to face      norethindrone-e.estradioL-iron (Loestrin 24 FE) 1 mg-20 mcg (24)/75 mg (4) tablet Take 1 tablet by mouth once daily. 84 tablet 3    tretinoin (Retin-A) 0.05 % cream Apply topically once daily at bedtime.  apply to face      [DISCONTINUED] escitalopram (Lexapro) 5 mg tablet Take 1 tablet (5 mg) by mouth once daily. 90 tablet 0    amphetamine-dextroamphetamine (AdderalL) 5 mg tablet Take 1 tablet (5 mg) by mouth 2 times a day as needed (ADHD symptoms). 60 tablet 0    [DISCONTINUED] atomoxetine (Strattera) 60 mg capsule Take 1 capsule (60 mg) by mouth once daily. Swallow capsule whole; do not open. If opened accidentally, do not touch eyes; wash hands immediately (product is an eye irritant). (Patient not taking: Reported on 12/23/2024) 30 capsule 11     No current facility-administered medications on file prior to visit.         MEDICAL-DECISION MAKING  Moderate: 1 or more chronic illnesses with exacerbation, progression, or side effects of treatment with Prescription drug management          IMPRESSION  This is a 19-year-old female who presents for follow-up for ADHD and anxiety/mood symptoms related to her menstrual cycle. The patient does note improvement in symptoms of depression and anxiety since starting Lexapro.  The patient has not been utilizing Adderall as frequently, reporting only needing it about once or twice a  week. The patient does not require prescription at this time for the Adderall.  She has had recent stressors including a break-up and having the flu as well as trying to keep up with her schoolwork.  Due to the recent increase stressors and anxiety, the patient is agreeable to having the Lexapro increased to 10 mg daily.  She endorses symptoms that correlate with an adjustment disorder with anxious mood.  She is also agreeable to retrying hydroxyzine as needed for breakthrough anxiety.  She denies any recent suicidal ideation.   She is help seeking and future oriented.  She is agreeable to following up on 3/10/2025.      Diagnoses and all orders for this visit:  Attention deficit hyperactivity disorder (ADHD), unspecified ADHD type  -     Follow Up In Psychiatry; Future  Anxiety disorder due to known physiological condition  -     escitalopram (Lexapro) 10 mg tablet; Take 1 tablet (10 mg) by mouth once daily.  -     hydrOXYzine HCL (Atarax) 10 mg tablet; Take 1 tablet (10 mg) by mouth 3 times a day as needed for anxiety.  -     Follow Up In Psychiatry; Future  Adjustment disorder with anxious mood  -     escitalopram (Lexapro) 10 mg tablet; Take 1 tablet (10 mg) by mouth once daily.  -     hydrOXYzine HCL (Atarax) 10 mg tablet; Take 1 tablet (10 mg) by mouth 3 times a day as needed for anxiety.  -     Follow Up In Psychiatry; Future         Diagnoses  Problem List Items Addressed This Visit    None  Visit Diagnoses       Attention deficit hyperactivity disorder (ADHD), unspecified ADHD type        Relevant Orders    Follow Up In Psychiatry    Anxiety disorder due to known physiological condition        Relevant Medications    escitalopram (Lexapro) 10 mg tablet    hydrOXYzine HCL (Atarax) 10 mg tablet    Other Relevant Orders    Follow Up In Psychiatry    Adjustment disorder with anxious mood        Relevant Medications    escitalopram (Lexapro) 10 mg tablet    hydrOXYzine HCL (Atarax) 10 mg tablet    Other Relevant  Orders    Follow Up In Psychiatry                Risk Assessment  Acute risk for suicide: Low  Chronic risk for suicide: Low          PLAN  -Continue Adderall 5 mg by mouth twice daily as needed; no prescription needed at this time; controlled substance agreement, need for urine drug screen, need for in person appointment will be discussed when the patient needs a refill on the Adderall  -Continue hydroxyzine 10 mg by mouth 3 times daily as needed for anxiety/irritability; prescription sent for hydroxyzine 10 mg by mouth 3 times daily as needed for anxiety, dispensed 90 with no refills  -Increase Lexapro 10 mg by mouth daily for anxiety and mood symptoms; prescription sent for Lexapro 10 mg by mouth daily, dispensed 90 with no refills  -Follow-up with this writer on 3/10/2025  -Risks/benefits/assessment of medication interventions discussed with pt; pt agreeable to plan. Will continue to monitor for symptoms management and side effects and adjust plan as needed.  -Motivational interviewing to increase coping skills/behavior regulation.  -Call  Psychiatry at [phone] or communicate through [employer] with issues .   -For [facility], [employer] is a 24/7 hotline you can call for assistance at [phone]) [phone]. Please call 242 or go to your closest Emergency Room if you feel worse. This includes thoughts of hurting yourself or anyone else, or having other troubles such as hearing voices, seeing visions, or having new and scary thoughts about the people around you.      Review with patient: Treatment plan reviewed with the patient.  Medication risks/benefit reviewed with the patient      Time Spent:    Prep time: 2 minutes  Direct patient time: 9 minutes  Documentation time: 7 minutes   Total time: 18 minutes    Aicha Harry, MICHAEL-CNP

## 2025-03-10 ENCOUNTER — APPOINTMENT (OUTPATIENT)
Dept: BEHAVIORAL HEALTH | Facility: CLINIC | Age: 20
End: 2025-03-10
Payer: COMMERCIAL

## 2025-03-13 ENCOUNTER — APPOINTMENT (OUTPATIENT)
Dept: BEHAVIORAL HEALTH | Facility: CLINIC | Age: 20
End: 2025-03-13
Payer: COMMERCIAL

## 2025-03-13 DIAGNOSIS — F90.9 ATTENTION DEFICIT HYPERACTIVITY DISORDER (ADHD), UNSPECIFIED ADHD TYPE: ICD-10-CM

## 2025-03-13 DIAGNOSIS — F43.23 ADJUSTMENT DISORDER WITH MIXED ANXIETY AND DEPRESSED MOOD: ICD-10-CM

## 2025-03-13 DIAGNOSIS — F06.4 ANXIETY DISORDER DUE TO KNOWN PHYSIOLOGICAL CONDITION: ICD-10-CM

## 2025-03-13 PROCEDURE — 99214 OFFICE O/P EST MOD 30 MIN: CPT | Performed by: NURSE PRACTITIONER

## 2025-03-13 RX ORDER — HYDROXYZINE HYDROCHLORIDE 10 MG/1
10 TABLET, FILM COATED ORAL 3 TIMES DAILY PRN
Qty: 90 TABLET | Refills: 2 | Status: SHIPPED | OUTPATIENT
Start: 2025-03-13 | End: 2025-06-11

## 2025-03-13 RX ORDER — ESCITALOPRAM OXALATE 10 MG/1
15 TABLET ORAL DAILY
Qty: 135 TABLET | Refills: 0 | Status: SHIPPED | OUTPATIENT
Start: 2025-03-13 | End: 2025-06-11

## 2025-03-13 NOTE — PROGRESS NOTES
"Time in: 1102  Time out: 1111    An interactive audio and video telecommunication system which permits real time communications between the patient (at the originating site) and provider (at the distant site) was utilized to provide this telehealth service.   Verbal consent was requested and obtained from Abby Jackson on this date, 03/13/25 for a telehealth visit and the patient's location was confirmed at the time of the visit.     The patient verified date of birth, address and phone number.     Preferred Name:  Abby    Chief Complaint  \"I feel like things are about the same.\"       History of Present Illness:  Abby Jackson is a 19 y.o. female patient with a chief complaint of medication management presenting to outpatient treatment for a scheduled psych outpatient psychiatric follow-up.    The patient reports, \"I feel like things are about the same.\" She reports that she has not noticed any effect from the increase in Lexapro. She reports that her classes are hard this semester but only has about 6 weeks of school left in the semester. She reports that she is on Spring break this week. She reports that her sleep is \"fine.\" She reports that she is slightly worse in the break up situation because she was still having communication with him and now they are doing \"no contact\" with him for at least one month because talking was making things harder. She reports that she has not been using the Adderall as frequently. She reports that she has been putting school off because she has been distracted \"by everything.\" She reports that she is behind in a couple online classes. She reports that she has been taking hydroxyzine about twice daily and finds it helpful.  She denies any current suicidal ideation.  She continues to be hopeful and future oriented.        Falls  History of falls: No  Have you fallen in the past 12 months: No        High Blood pressure  No        Depression Screening:   Mild increase since " "break-up  Plan: Medication, Therapy, and Follow up with this writer        Anxiety Screening:  Mild increase since break-up  Plan: Medication, Therapy, and Follow up with this writer      Record Review: brief      Mental Status Exam:  General appearance: Appears state age, appropriate eye contact, adequate grooming and hygiene  Attitude: Calm, cooperative, and engaged in conversation.  Behavior: Appropriate eye contact.   Motor Activity: No psychomotor agitation or retardation. No abnormal movements, tremors or tics. No evidence of extrapyramidal symptoms or tardive dyskinesia.  Speech: Regular rate, rhythm, volume. Spontaneous, no pressured speech.  Mood: \"About the same.\"  Affect: Appropriate, full range, mood congruent.  Thought Process: Linear, logical, and goal-directed. No loose associations or gross thought disorganization.  Thought Content: Denied current suicidal ideation or thoughts of harm to self, denied homicidal ideation or thoughts of harm to others. No delusional thinking elicited. No perseverations or obsessions identified.   Perception: Did not endorse auditory or visual hallucinations, did not appear to be responding to hallucinatory stimuli.   Cognition: Alert, oriented x3. Preserved attention span and concentration, recent and remote memory. Adequate fund of knowledge. No deficits in language.   Insight: Good, in regards to understanding mental health condition  Judgement: Good        Review of Systems  Eyes  no discharge, no pain  Ears, Nose, Mouth, Throat  no pain, no rhinorrhea, no dysphagia  Resp  no dyspnea, no cough  GI  no nausea, vomiting, diarrhea    no urgency, no dysuria  Muskuloskeletal  no pain, no weakness  Integumentary  no rash  Endocrine   no polyurea  Hematologic  no bruising, no bleeding problems  CV  no palpitations, no pain  Pulm  no chest pain  Neuro  no weakness, no coordination problems, no dizziness  Constitutional  energy, appetite normal  Psychiatric  see psychiatric " review of systems and HPI        Vitals:  There were no vitals filed for this visit.        OARRS:  Aicha Howard, APRN-CNP on 3/13/2025 11:01 AM  I have personally reviewed the OARRS report for Abby Jackson. I have considered the risks of abuse, dependence, addiction and diversion      Will discuss controlled substance agreement, drug screen, and in person appointment when her Adderall needs refilled. She is only utilizing the Adderall 1-2 times per week.       Current Medications  Current Outpatient Medications on File Prior to Visit   Medication Sig Dispense Refill    amphetamine-dextroamphetamine (AdderalL) 5 mg tablet Take 1 tablet (5 mg) by mouth 2 times a day as needed (ADHD symptoms). 60 tablet 0    clindamycin-benzoyl peroxide (BenzacLIN) gel Apply topically once daily in the morning.  apply to face      norethindrone-e.estradioL-iron (Loestrin 24 FE) 1 mg-20 mcg (24)/75 mg (4) tablet Take 1 tablet by mouth once daily. 84 tablet 3    tretinoin (Retin-A) 0.05 % cream Apply topically once daily at bedtime.  apply to face      [DISCONTINUED] escitalopram (Lexapro) 10 mg tablet Take 1 tablet (10 mg) by mouth once daily. 90 tablet 0    [DISCONTINUED] hydrOXYzine HCL (Atarax) 10 mg tablet Take 1 tablet (10 mg) by mouth 3 times a day as needed for anxiety. 90 tablet 0     No current facility-administered medications on file prior to visit.         MEDICAL-DECISION MAKING  Moderate: 1 or more chronic illnesses with exacerbation, progression, or side effects of treatment with Prescription drug management          IMPRESSION  This is a 19-year-old female who presents for follow-up for ADHD and anxiety/mood symptoms. The patient does note improvement in symptoms of depression and anxiety related to her menstrual cycle since starting Lexapro.  The patient has not been utilizing Adderall frequently, reporting only needing it about once or twice a week. The patient does not require prescription at this time for the  Adderall.  She has had recent stressor of a break-up that has impacted her mood.  Due to the recent increase stressors and anxiety, the patient is agreeable to having the Lexapro increased to 15 mg daily as she did not notice much benefit from the increase to 10 mg last month.  She endorses symptoms that correlate with an adjustment disorder with anxious and depressed mood.  She has been utilizing the as needed hydroxyzine and finds it beneficial for anxiety.  She has been using it about twice daily.  She denies any recent suicidal ideation.   She is help seeking and future oriented.  She is agreeable to following up on 4/17/2025.      Diagnoses and all orders for this visit:  Attention deficit hyperactivity disorder (ADHD), unspecified ADHD type  -     Follow Up In Psychiatry  -     Follow Up In Psychiatry; Future  Anxiety disorder due to known physiological condition  -     Follow Up In Psychiatry  -     hydrOXYzine HCL (Atarax) 10 mg tablet; Take 1 tablet (10 mg) by mouth 3 times a day as needed for anxiety.  -     escitalopram (Lexapro) 10 mg tablet; Take 1.5 tablets (15 mg) by mouth once daily.  -     Follow Up In Psychiatry; Future  Adjustment disorder with mixed anxiety and depressed mood  -     Follow Up In Psychiatry  -     hydrOXYzine HCL (Atarax) 10 mg tablet; Take 1 tablet (10 mg) by mouth 3 times a day as needed for anxiety.  -     escitalopram (Lexapro) 10 mg tablet; Take 1.5 tablets (15 mg) by mouth once daily.  -     Follow Up In Psychiatry; Future         Diagnoses  Problem List Items Addressed This Visit    None  Visit Diagnoses       Attention deficit hyperactivity disorder (ADHD), unspecified ADHD type        Relevant Orders    Follow Up In Psychiatry    Anxiety disorder due to known physiological condition        Relevant Medications    hydrOXYzine HCL (Atarax) 10 mg tablet    escitalopram (Lexapro) 10 mg tablet    Other Relevant Orders    Follow Up In Psychiatry    Adjustment disorder with  mixed anxiety and depressed mood        Relevant Medications    hydrOXYzine HCL (Atarax) 10 mg tablet    escitalopram (Lexapro) 10 mg tablet    Other Relevant Orders    Follow Up In Psychiatry                Risk Assessment  Acute risk for suicide: Low  Chronic risk for suicide: Low             PLAN  -Continue Adderall 5 mg by mouth twice daily as needed; no prescription needed at this time; controlled substance agreement, need for urine drug screen, need for in person appointment will be discussed when the patient needs a refill on the Adderall  -Continue hydroxyzine 10 mg by mouth 3 times daily as needed for anxiety/irritability; prescription sent for hydroxyzine 10 mg by mouth 3 times daily as needed for anxiety, dispensed 90 with 2 refills  -Increase Lexapro 15 mg by mouth daily for anxiety and mood symptoms; prescription sent for Lexapro 15 mg by mouth daily, dispensed 135 with no refills  -Follow-up with this writer on 4/17/25  -Risks/benefits/assessment of medication interventions discussed with pt; pt agreeable to plan. Will continue to monitor for symptoms management and side effects and adjust plan as needed.  -Motivational interviewing to increase coping skills/behavior regulation.  -Call  Psychiatry at [phone] or communicate through [employer] with issues .   -For [facility] residents, [employer] is a 24/7 hotline you can call for assistance at [phone]) [phone]. Please call 908 or go to your closest Emergency Room if you feel worse. This includes thoughts of hurting yourself or anyone else, or having other troubles such as hearing voices, seeing visions, or having new and scary thoughts about the people around you.         Review with patient: Treatment plan reviewed with the patient.  Medication risks/benefit reviewed with the patient      Time Spent:    Prep time: 2 minutes  Direct patient time: 9 minutes  Documentation time: 6 minutes  Total time: 17 minutes    Aicha Harry  APRN-CNP

## 2025-04-17 ENCOUNTER — APPOINTMENT (OUTPATIENT)
Dept: BEHAVIORAL HEALTH | Facility: CLINIC | Age: 20
End: 2025-04-17
Payer: COMMERCIAL

## 2025-04-17 DIAGNOSIS — Z79.899 MEDICATION MANAGEMENT: ICD-10-CM

## 2025-04-17 DIAGNOSIS — F06.4 ANXIETY DISORDER DUE TO KNOWN PHYSIOLOGICAL CONDITION: ICD-10-CM

## 2025-04-17 DIAGNOSIS — F90.9 ATTENTION DEFICIT HYPERACTIVITY DISORDER (ADHD), UNSPECIFIED ADHD TYPE: ICD-10-CM

## 2025-04-17 DIAGNOSIS — Z00.00 HEALTHCARE MAINTENANCE: ICD-10-CM

## 2025-04-17 PROCEDURE — 99214 OFFICE O/P EST MOD 30 MIN: CPT | Performed by: NURSE PRACTITIONER

## 2025-04-17 RX ORDER — DEXTROAMPHETAMINE SACCHARATE, AMPHETAMINE ASPARTATE, DEXTROAMPHETAMINE SULFATE AND AMPHETAMINE SULFATE 1.25; 1.25; 1.25; 1.25 MG/1; MG/1; MG/1; MG/1
5 TABLET ORAL 2 TIMES DAILY PRN
Qty: 60 TABLET | Refills: 0 | Status: SHIPPED | OUTPATIENT
Start: 2025-04-17 | End: 2025-05-17

## 2025-04-17 NOTE — PROGRESS NOTES
"Time in: 1033  Time out: 1043    An interactive audio and video telecommunication system which permits real time communications between the patient (at the originating site) and provider (at the distant site) was utilized to provide this telehealth service.   Verbal consent was requested and obtained from Abby Jackson on this date, 04/17/25 for a telehealth visit and the patient's location was confirmed at the time of the visit.     The patient verified date of birth, address and phone number.     Preferred Name:  Abby    Chief Complaint  \"I only have 1 day of school left.\"      History of Present Illness:  Abby Jackson is a 19 y.o. female patient with a chief complaint of medication management presenting to outpatient treatment for a scheduled psych outpatient psychiatric follow-up.    The patient reports, \"Pretty good.\" She reports that she only has one day of school left which she is excited about. 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 She reports that she has been taking the Adderall a couple times per week, only once daily when she does take it. She reports that she is using the hydroxyzine 1-2 times daily and finds it helpful. She feels the Lexapro dose is adequate at it's current dose. She denies any suicidal ideation.       Falls  History of falls: No  Have you fallen in the past 12 months: No        High Blood pressure  No        Depression Screening:   Manageable  Plan: Medication, Therapy, and Follow up with this writer        Anxiety Screening:  Manageable  Plan: Medication, Therapy, and Follow up with this writer      Record Review: brief      Mental Status Exam:  General appearance: Appears state age, appropriate eye contact, adequate grooming and hygiene  Attitude: Calm, cooperative, and engaged in conversation.  Behavior: Appropriate eye contact.   Motor Activity: No psychomotor agitation or retardation. No abnormal movements, tremors " "or tics. No evidence of extrapyramidal symptoms or tardive dyskinesia.  Speech: Regular rate, rhythm, volume. Spontaneous, no pressured speech.  Mood: \"Pretty good.\"  Affect: Appropriate, full range, mood congruent.  Thought Process: Linear, logical, and goal-directed. No loose associations or gross thought disorganization.  Thought Content: Denied current suicidal ideation or thoughts of harm to self, denied homicidal ideation or thoughts of harm to others. No delusional thinking elicited. No perseverations or obsessions identified.   Perception: Did not endorse auditory or visual hallucinations, did not appear to be responding to hallucinatory stimuli.   Cognition: Alert, oriented x3. Preserved attention span and concentration, recent and remote memory. Adequate fund of knowledge. No deficits in language.   Insight: Good, in regards to understanding mental health condition  Judgement: Good        Review of Systems  Eyes  no discharge, no pain  Ears, Nose, Mouth, Throat  no pain, no rhinorrhea, no dysphagia  Resp  no dyspnea, no cough  GI  no nausea, vomiting, diarrhea    no urgency, no dysuria  Muskuloskeletal  no pain, no weakness  Integumentary  no rash  Endocrine   no polyurea  Hematologic  no bruising, no bleeding problems  CV  no palpitations, no pain  Pulm  no chest pain  Neuro  no weakness, no coordination problems, no dizziness  Constitutional  energy, appetite normal  Psychiatric  see psychiatric review of systems and HPI        Vitals:  There were no vitals filed for this visit.        OARRS:  Aicha Howard, MICHAEL-CNP on 4/17/2025 10:37 AM  I have personally reviewed the OARRS report for Abby Jackson. I have considered the risks of abuse, dependence, addiction and diversion    Is the patient prescribed a combination of a benzodiazepine and opioid?  No    Last Urine Drug Screen / ordered today: Yes  No results found for this or any previous visit (from the past 8760 hours).  N/A      Controlled " Substance Agreement:  Date of the Last Agreement: Sent to patient to sign on 4/17/2025  Reviewed Controlled Substance Agreement including but not limited to the benefits, risks, and alternatives to treatment with a Controlled Substance medication(s).    Stimulants:   What is the patient's goal of therapy?  Improved focus and concentration  Is this being achieved with current treatment?  Yes    Activities of Daily Living:   Is your overall impression that this patient is benefiting (symptom reduction outweighs side effects) from stimulant therapy? Yes     1. Physical Functioning: Better  2. Family Relationship: Better  3. Social Relationship: Better  4. Mood: Better  5. Sleep Patterns: Better  6. Overall Function: Better        In person appointment: Pending for 5/13/2025  Urine drug screen: Ordered on 4/17/2025  Urine Confirmation: Ordered on 4/17/2025  Controlled substance agreement: Sent to patient to sign on 4/17/2025      Current Medications  Medications Ordered Prior to Encounter[1]      MEDICAL-DECISION MAKING  Moderate: 2 or more stable chronic illnesses with Prescription drug management          IMPRESSION  This is a 19-year-old female who presents for follow-up for ADHD and anxiety/mood symptoms. The patient does note improvement in symptoms of depression and anxiety at the current dose of Lexapro which was increased last month.  The patient has not been utilizing Adderall frequently, reporting only needing it about once or twice a week, once daily when she does use that.  It appears that the adjustment disorder that she was experiencing related to a break-up has resolved.  She has been utilizing the as needed hydroxyzine and finds it beneficial for anxiety.  She has been using it about once or twice daily.  She denies any recent suicidal ideation.   She is help seeking and future oriented.  She is agreeable to following up on 5/13/2025      Diagnoses and all orders for this visit:  Attention deficit  hyperactivity disorder (ADHD), unspecified ADHD type  -     Follow Up In Psychiatry  -     amphetamine-dextroamphetamine (AdderalL) 5 mg tablet; Take 1 tablet (5 mg) by mouth 2 times a day as needed (ADHD symptoms).  -     Follow Up In Psychiatry; Future  Anxiety disorder due to known physiological condition  -     Follow Up In Psychiatry  -     Follow Up In Psychiatry; Future  Medication management  -     Drug Screen, Urine With Reflex to Confirmation; Future  -     Drug Screen, Urine With Reflex to Confirmation  Healthcare maintenance  -     Drug Screen, Urine With Reflex to Confirmation; Future  -     Drug Screen, Urine With Reflex to Confirmation         Diagnoses  Problem List Items Addressed This Visit    None  Visit Diagnoses         Attention deficit hyperactivity disorder (ADHD), unspecified ADHD type        Relevant Medications    amphetamine-dextroamphetamine (AdderalL) 5 mg tablet    Other Relevant Orders    Follow Up In Psychiatry      Anxiety disorder due to known physiological condition        Relevant Orders    Follow Up In Psychiatry      Medication management        Relevant Orders    Drug Screen, Urine With Reflex to Confirmation      Healthcare maintenance        Relevant Orders    Drug Screen, Urine With Reflex to Confirmation                Risk Assessment  Acute risk for suicide: Low  Chronic risk for suicide: Low          PLAN  -Continue Adderall 5 mg by mouth twice daily as needed; prescription sent for Adderall 5 mg by mouth twice daily as needed for ADHD symptoms, dispense 60 with no refills  -Continue hydroxyzine 10 mg by mouth 3 times daily as needed for anxiety/irritability; no prescription needed at this time  -Increase Lexapro 15 mg by mouth daily for anxiety and mood symptoms; no prescription needed at this time  -Follow-up with this writer on 5/13/2025 in person  -Risks/benefits/assessment of medication interventions discussed with pt; pt agreeable to plan. Will continue to monitor  for symptoms management and side effects and adjust plan as needed.  -Motivational interviewing to increase coping skills/behavior regulation.  -Call  Psychiatry at [phone] or communicate through [employer] with issues .   -For [facility] residents, [employer] is a 24/7 hotline you can call for assistance at (669) 766-1331. Please call 911 or go to your closest Emergency Room if you feel worse. This includes thoughts of hurting yourself or anyone else, or having other troubles such as hearing voices, seeing visions, or having new and scary thoughts about the people around you.      Review with patient: Treatment plan reviewed with the patient.  Medication risks/benefit reviewed with the patient      Time Spent:    Prep time: 2 minutes  Direct patient time: 10 minutes  Documentation time: 7 minutes  Total time: 19 minutes    MICHAEL Spring-EDSON         [1]   Current Outpatient Medications on File Prior to Visit   Medication Sig Dispense Refill    clindamycin-benzoyl peroxide (BenzacLIN) gel Apply topically once daily in the morning.  apply to face      escitalopram (Lexapro) 10 mg tablet Take 1.5 tablets (15 mg) by mouth once daily. 135 tablet 0    hydrOXYzine HCL (Atarax) 10 mg tablet Take 1 tablet (10 mg) by mouth 3 times a day as needed for anxiety. 90 tablet 2    norethindrone-e.estradioL-iron (Loestrin 24 FE) 1 mg-20 mcg (24)/75 mg (4) tablet Take 1 tablet by mouth once daily. 84 tablet 3    tretinoin (Retin-A) 0.05 % cream Apply topically once daily at bedtime.  apply to face      [DISCONTINUED] amphetamine-dextroamphetamine (AdderalL) 5 mg tablet Take 1 tablet (5 mg) by mouth 2 times a day as needed (ADHD symptoms). 60 tablet 0     No current facility-administered medications on file prior to visit.

## 2025-05-02 ENCOUNTER — APPOINTMENT (OUTPATIENT)
Dept: PRIMARY CARE | Facility: CLINIC | Age: 20
End: 2025-05-02
Payer: COMMERCIAL

## 2025-05-12 ENCOUNTER — APPOINTMENT (OUTPATIENT)
Dept: PRIMARY CARE | Facility: CLINIC | Age: 20
End: 2025-05-12
Payer: COMMERCIAL

## 2025-05-12 VITALS
DIASTOLIC BLOOD PRESSURE: 77 MMHG | HEART RATE: 85 BPM | SYSTOLIC BLOOD PRESSURE: 113 MMHG | WEIGHT: 144.2 LBS | BODY MASS INDEX: 23.18 KG/M2 | TEMPERATURE: 98.2 F | HEIGHT: 66 IN

## 2025-05-12 DIAGNOSIS — Z11.3 ROUTINE SCREENING FOR STI (SEXUALLY TRANSMITTED INFECTION): ICD-10-CM

## 2025-05-12 DIAGNOSIS — Z00.00 ADULT GENERAL MEDICAL EXAM: Primary | ICD-10-CM

## 2025-05-12 DIAGNOSIS — Z30.41 SURVEILLANCE FOR BIRTH CONTROL, ORAL CONTRACEPTIVES: ICD-10-CM

## 2025-05-12 DIAGNOSIS — Z78.9 VEGETARIAN DIET: ICD-10-CM

## 2025-05-12 PROCEDURE — 99395 PREV VISIT EST AGE 18-39: CPT | Performed by: FAMILY MEDICINE

## 2025-05-12 PROCEDURE — 3008F BODY MASS INDEX DOCD: CPT | Performed by: FAMILY MEDICINE

## 2025-05-12 NOTE — PATIENT INSTRUCTIONS
[email or web address]

## 2025-05-12 NOTE — PROGRESS NOTES
"Subjective   Patient ID: Abby Jackson is a 19 y.o. female who presents for Annual Exam (Physical).    HPI   Nutrition: overall balanced, decent variety fruits/vegetables. Most meals at home.  Occasional coffee. Water.   Exercise: walking, weights  Sleep:  ok sleep onset; 8+hrs/nght  Eye: annual; contsact lens  Dental: pending  On ocp  No missed doses  Often skips withdrawal bleed  No btb bleeding.   No new partners  No plans for pregnancy in next year  Feels safe in current relationship      Review of Systems   Constitutional:  Negative for fatigue and unexpected weight change.   HENT:  Negative for congestion, rhinorrhea and sore throat.    Eyes:  Negative for visual disturbance.   Respiratory:  Negative for cough, chest tightness and shortness of breath.    Cardiovascular:  Negative for chest pain, palpitations and leg swelling.   Gastrointestinal:  Negative for abdominal pain, constipation, diarrhea, nausea and vomiting.   Genitourinary:  Negative for dysuria, frequency, menstrual problem and urgency.   Musculoskeletal:  Negative for arthralgias and myalgias.   Neurological:  Negative for light-headedness and headaches.   Psychiatric/Behavioral:  Negative for sleep disturbance.        Objective   /77 (BP Location: Left arm, Patient Position: Sitting)   Pulse 85   Temp 36.8 °C (98.2 °F)   Ht 1.676 m (5' 6\")   Wt 65.4 kg (144 lb 3.2 oz)   BMI 23.27 kg/m²     Physical Exam  Vitals reviewed.   Constitutional:       General: She is not in acute distress.     Appearance: Normal appearance. She is normal weight.   HENT:      Head: Normocephalic and atraumatic.      Right Ear: Tympanic membrane and ear canal normal.      Left Ear: Tympanic membrane and ear canal normal.      Nose: Nose normal. No congestion or rhinorrhea.      Mouth/Throat:      Mouth: Mucous membranes are moist.      Pharynx: Oropharynx is clear.   Eyes:      Extraocular Movements: Extraocular movements intact.      Conjunctiva/sclera: " Conjunctivae normal.      Pupils: Pupils are equal, round, and reactive to light.   Cardiovascular:      Rate and Rhythm: Normal rate and regular rhythm.      Pulses: Normal pulses.      Heart sounds: Normal heart sounds. No murmur heard.  Pulmonary:      Effort: Pulmonary effort is normal. No respiratory distress.      Breath sounds: Normal breath sounds.   Abdominal:      General: Abdomen is flat. Bowel sounds are normal.      Palpations: Abdomen is soft.      Tenderness: There is no abdominal tenderness.   Musculoskeletal:         General: Normal range of motion.      Cervical back: Normal range of motion and neck supple.      Right lower leg: No edema.      Left lower leg: No edema.   Lymphadenopathy:      Cervical: No cervical adenopathy.   Skin:     General: Skin is warm and dry.   Neurological:      General: No focal deficit present.      Mental Status: She is alert and oriented to person, place, and time.   Psychiatric:         Mood and Affect: Mood normal.         Thought Content: Thought content normal.         Assessment/Plan   Assessment & Plan  Adult general medical exam  Continue healthy food choices and regular physical activity  Seasonal flu and COVID boosters in the fall  Proceed with screening for STIs as ordered  Continue current OCP.  Discussed initiation of cervical cancer screening after next birthday.  She does reports she has a visit scheduled with her mother's gynecologist for later this year.  She will call if she needs any updated referrals.       Routine screening for STI (sexually transmitted infection)    Orders:    HIV 1/2 Antigen/Antibody Screen with Reflex to Confirmation; Future    Hepatitis C Antibody; Future    Syphilis Screen with Reflex; Future    CBC and Auto Differential; Future    Vegetarian diet    Orders:    CBC and Auto Differential; Future    Vitamin B12; Future    Surveillance for birth control, oral contraceptives    Orders:    norethindrone-e.estradioL-iron (Loestrin  24 FE) 1 mg-20 mcg (24)/75 mg (4) tablet; Take 1 tablet by mouth once daily.

## 2025-05-13 ENCOUNTER — APPOINTMENT (OUTPATIENT)
Dept: BEHAVIORAL HEALTH | Facility: CLINIC | Age: 20
End: 2025-05-13
Payer: COMMERCIAL

## 2025-05-13 ASSESSMENT — ENCOUNTER SYMPTOMS
DYSURIA: 0
MYALGIAS: 0
VOMITING: 0
DIARRHEA: 0
CONSTIPATION: 0
LIGHT-HEADEDNESS: 0
SLEEP DISTURBANCE: 0
SHORTNESS OF BREATH: 0
UNEXPECTED WEIGHT CHANGE: 0
FREQUENCY: 0
SORE THROAT: 0
COUGH: 0
NAUSEA: 0
CHEST TIGHTNESS: 0
ARTHRALGIAS: 0
PALPITATIONS: 0
ABDOMINAL PAIN: 0
FATIGUE: 0
RHINORRHEA: 0
HEADACHES: 0

## 2025-05-16 ENCOUNTER — APPOINTMENT (OUTPATIENT)
Dept: BEHAVIORAL HEALTH | Facility: CLINIC | Age: 20
End: 2025-05-16
Payer: COMMERCIAL

## 2025-05-16 VITALS
SYSTOLIC BLOOD PRESSURE: 121 MMHG | DIASTOLIC BLOOD PRESSURE: 80 MMHG | RESPIRATION RATE: 16 BRPM | BODY MASS INDEX: 23.08 KG/M2 | WEIGHT: 143 LBS | HEART RATE: 91 BPM | TEMPERATURE: 97.2 F

## 2025-05-16 DIAGNOSIS — F06.4 ANXIETY DISORDER DUE TO KNOWN PHYSIOLOGICAL CONDITION: ICD-10-CM

## 2025-05-16 DIAGNOSIS — Z79.899 MEDICATION MANAGEMENT: ICD-10-CM

## 2025-05-16 DIAGNOSIS — Z00.00 HEALTHCARE MAINTENANCE: ICD-10-CM

## 2025-05-16 DIAGNOSIS — F90.9 ATTENTION DEFICIT HYPERACTIVITY DISORDER (ADHD), UNSPECIFIED ADHD TYPE: ICD-10-CM

## 2025-05-16 PROCEDURE — 99214 OFFICE O/P EST MOD 30 MIN: CPT | Performed by: NURSE PRACTITIONER

## 2025-05-16 RX ORDER — DEXTROAMPHETAMINE SACCHARATE, AMPHETAMINE ASPARTATE, DEXTROAMPHETAMINE SULFATE AND AMPHETAMINE SULFATE 1.25; 1.25; 1.25; 1.25 MG/1; MG/1; MG/1; MG/1
5 TABLET ORAL 2 TIMES DAILY PRN
Qty: 60 TABLET | Refills: 0 | Status: SHIPPED | OUTPATIENT
Start: 2025-05-16 | End: 2025-06-15

## 2025-05-16 RX ORDER — ESCITALOPRAM OXALATE 10 MG/1
15 TABLET ORAL DAILY
Qty: 135 TABLET | Refills: 3 | Status: SHIPPED | OUTPATIENT
Start: 2025-05-16 | End: 2026-05-16

## 2025-05-16 ASSESSMENT — COLUMBIA-SUICIDE SEVERITY RATING SCALE - C-SSRS
1. SINCE LAST CONTACT, HAVE YOU WISHED YOU WERE DEAD OR WISHED YOU COULD GO TO SLEEP AND NOT WAKE UP?: NO
1. HAVE YOU WISHED YOU WERE DEAD OR WISHED YOU COULD GO TO SLEEP AND NOT WAKE UP?: NO
2. HAVE YOU ACTUALLY HAD ANY THOUGHTS OF KILLING YOURSELF?: NO
2. HAVE YOU ACTUALLY HAD ANY THOUGHTS OF KILLING YOURSELF?: NO

## 2025-05-16 ASSESSMENT — PAIN SCALES - GENERAL: PAINLEVEL_OUTOF10: 0-NO PAIN

## 2025-05-16 NOTE — PROGRESS NOTES
"Time in: 1231  Time out: 1245      Preferred Name:  Abby    Chief Complaint  \"I go to [place] tomorrow.\"       History of Present Illness:  Abby Jackson is a 19 y.o. female patient with a chief complaint of medication management presenting to outpatient treatment for a scheduled psych outpatient psychiatric follow-up.    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 She reports that she is \"pretty good.\" She reports that she finished her semester a couple weeks ago. She reports that next year is her last year. She denies any suicidal ideation. She reports that she has not needed the Adderall in the past couple weeks with being out of school. She feels that the Lexapro is adequate at the current dose. She reports that she has physical symptoms of anxiety like she is \"buzzing\" at times. She reports that she does use the hydroxyzine about once daily and finds it helpful.  She is open to utilizing it more frequently during the day to help with the physical symptoms of anxiety.  She denies any significant depressive symptoms.    The patient started receiving mental health treatment in 2023 for ADHD and anxiety. She reports that she was struggling with focus and going from high school to college was difficult. She denies any prior suicide attempts, suicidal ideation, psychiatric hospitalizations, self harm, or violence.        Falls  History of falls: No  Have you fallen in the past 12 months: No        High Blood pressure  No        Depression Screening:   Manageable  Plan: Medication, Therapy, and Follow up with this writer        Anxiety Screening:  Manageable  Plan: Medication, Therapy, and Follow up with this writer      Tobacco Cessation:  Do you use tobacco products: No  Do you want tobacco cessation treatment: No      Record Review: brief      Mental Status Exam:  General appearance: Appears state age, appropriate eye contact, adequate grooming and hygiene  Attitude: " "Calm, cooperative, and engaged in conversation.  Behavior: Appropriate eye contact.   Motor Activity: No psychomotor agitation or retardation. No abnormal movements, tremors or tics. No evidence of extrapyramidal symptoms or tardive dyskinesia.  Speech: Regular rate, rhythm, volume. Spontaneous, no pressured speech.  Mood: \"Good.\"   Affect: Appropriate, full range, mood congruent.  Thought Process: Linear, logical, and goal-directed. No loose associations or gross thought disorganization.  Thought Content: Denied current suicidal ideation or thoughts of harm to self, denied homicidal ideation or thoughts of harm to others. No delusional thinking elicited. No perseverations or obsessions identified.   Perception: Did not endorse auditory or visual hallucinations, did not appear to be responding to hallucinatory stimuli.   Cognition: Alert, oriented x3. Preserved attention span and concentration, recent and remote memory. Adequate fund of knowledge. No deficits in language.   Insight: Good, in regards to understanding mental health condition  Judgement: Good          Review of Systems  Eyes  no discharge, no pain  Ears, Nose, Mouth, Throat  no pain, no rhinorrhea, no dysphagia  Resp  no dyspnea, no cough  GI  no nausea, vomiting, diarrhea    no urgency, no dysuria  Muskuloskeletal  no pain, no weakness  Integumentary  no rash  Endocrine   no polyurea  Hematologic  no bruising, no bleeding problems  CV  no palpitations, no pain  Pulm  no chest pain  Neuro  no weakness, no coordination problems, no dizziness  Constitutional  energy, appetite normal  Psychiatric  see psychiatric review of systems and HPI        Vitals:  There were no vitals filed for this visit.        OARRS:  Aicha Howard, MICHAEL-CNP on 5/16/2025 12:30 PM  I have personally reviewed the OARRS report for Abby Jackson. I have considered the risks of abuse, dependence, addiction and diversion    Is the patient prescribed a combination of a " benzodiazepine and opioid?  No    Last Urine Drug Screen  Pending, collected on 5/16/2025      Controlled Substance Agreement:  Date of the Last Agreement:  4/18/2025  Reviewed Controlled Substance Agreement including but not limited to the benefits, risks, and alternatives to treatment with a Controlled Substance medication(s).     Stimulants:   What is the patient's goal of therapy?  Improved focus and concentration  Is this being achieved with current treatment?  Yes     Activities of Daily Living:   Is your overall impression that this patient is benefiting (symptom reduction outweighs side effects) from stimulant therapy? Yes      1. Physical Functioning: Better  2. Family Relationship: Better  3. Social Relationship: Better  4. Mood: Better  5. Sleep Patterns: Better  6. Overall Function: Better           In person appointment: 5/16/2025  Urine drug screen: Collected on 5/16/2025  Urine Confirmation: Collected on 5/16/2025  Controlled substance agreement: 4/18/2025        Current Medications  Medications Ordered Prior to Encounter[1]      MEDICAL-DECISION MAKING  Moderate: 2 or more stable chronic illnesses with Prescription drug management          IMPRESSION  This is a 19-year-old female who presents for follow-up for ADHD and anxiety.  The patient's mood symptoms have been well managed recently with the current dose of Lexapro.  The patient does note improvement in symptoms of depression and anxiety at the current dose of Lexapro.  The patient has not been utilizing Adderall frequently, reporting only needing it about once or twice a week, once daily when she does use that.  It appears that the adjustment disorder that she was experiencing related to a break-up has resolved.  She has been utilizing the as needed hydroxyzine and finds it beneficial for anxiety.  She has been using it about once or twice daily.  She denies any recent suicidal ideation.   She is help seeking and future oriented.  She is  agreeable to following up on 8/18/25.      Diagnoses and all orders for this visit:  Attention deficit hyperactivity disorder (ADHD), unspecified ADHD type  -     Follow Up In Psychiatry  Anxiety disorder due to known physiological condition  -     Follow Up In Psychiatry         Diagnoses  Problem List Items Addressed This Visit    None  Visit Diagnoses         Attention deficit hyperactivity disorder (ADHD), unspecified ADHD type          Anxiety disorder due to known physiological condition                    Risk Assessment  Acute risk for suicide: Low  Chronic risk for suicide: Low      SI/HI ASSESSMENT  -Risk Assessment: Abby Jackson is currently a low acute risk of suicide and self-harm due to no past suicide attempt(s) and not currently endorsing thoughts of suicide. Abby Jackson is currently a low acute risk of violence and harm to others due to no past history of violence and not currently threatening others.  -Suicidal Risk Factors:  and unmarried/single  -Violence Risk Factors: none  -Protective Factors: strong coping skills, sense of responsibility towards family, social support/connectedness, positive family relationships, hopefulness/future orientation, and employment  -Plan to Reduce Risk: Establish medication regimen, outpatient follow-up care, and increase coping skills .               Menard suicide severity rating scale  Suicidal and self-injurious behavior in the past 3 months: denies    Suicidal and self-injurious behavior in lifetime: Denies    Suicidal ideation (check most severe in past month): Denies    Activating events (recent): Moving to [place] for internship tomorrow    Treatment history: Previous psychiatric diagnosis and treatment and Current medications/treatment    Other risk factors: , No children, and Not     Clinical status (recent): Anxiety    Protective factors (recent): Identifies reasons for living, responsibility to family or others, living with  family/supports, supportive social network or family, and engaged in work or school    Other protective factors: Female, Age, and Employed/in school    Describe any suicidal, self-injurious, or aggressive behavior (include dates): Denies        PLAN  -Continue Adderall 5 mg by mouth twice daily as needed; prescription sent for Adderall 5 mg by mouth twice daily as needed for ADHD symptoms, dispense 60 with no refills  -Continue hydroxyzine 10 mg by mouth 3 times daily as needed for anxiety/irritability; no prescription needed at this time  - Continue Lexapro 15 mg by mouth daily for anxiety and mood symptoms; prescription sent for Lexapro 15 mg by mouth daily, dispense 90 with 3 refills to be filled on 5/26/2025  -Follow-up with this writer on 8/18/2025  -Risks/benefits/assessment of medication interventions discussed with pt; pt agreeable to plan. Will continue to monitor for symptoms management and side effects and adjust plan as needed.  -Motivational interviewing to increase coping skills/behavior regulation.  -Call  Psychiatry at [phone] or communicate through [facility] with issues .   -For [facility] residents, [facility] is a 24/7 hotline you can call for assistance at [phone]. Please call 158 or go to your closest Emergency Room if you feel worse. This includes thoughts of hurting yourself or anyone else, or having other troubles such as hearing voices, seeing visions, or having new and scary thoughts about the people around you.      Review with patient: Treatment plan reviewed with the patient.  Medication risks/benefit reviewed with the patient      Time Spent:    Prep time: 2 minutes  Direct patient time: 14 minutes  Documentation time: 7 minutes  Total time: 23 minutes    MICHAEL Spring-EDSON         [1]   Current Outpatient Medications on File Prior to Visit   Medication Sig Dispense Refill    amphetamine-dextroamphetamine (AdderalL) 5 mg tablet Take 1 tablet (5 mg) by  mouth 2 times a day as needed (ADHD symptoms). 60 tablet 0    clindamycin-benzoyl peroxide (BenzacLIN) gel Apply topically once daily in the morning.  apply to face      escitalopram (Lexapro) 10 mg tablet Take 1.5 tablets (15 mg) by mouth once daily. 135 tablet 0    hydrOXYzine HCL (Atarax) 10 mg tablet Take 1 tablet (10 mg) by mouth 3 times a day as needed for anxiety. 90 tablet 2    norethindrone-e.estradioL-iron (Loestrin 24 FE) 1 mg-20 mcg (24)/75 mg (4) tablet Take 1 tablet by mouth once daily. 84 tablet 1    tretinoin (Retin-A) 0.05 % cream Apply topically once daily at bedtime.  apply to face      [DISCONTINUED] norethindrone-e.estradioL-iron (Loestrin 24 FE) 1 mg-20 mcg (24)/75 mg (4) tablet Take 1 tablet by mouth once daily. 84 tablet 3     No current facility-administered medications on file prior to visit.

## 2025-05-18 LAB
AMPHET UR-MCNC: NORMAL NG/ML
AMPHETAMINES UR QL: NEGATIVE NG/ML
BARBITURATES UR QL: NEGATIVE NG/ML
BENZODIAZ UR QL: NEGATIVE NG/ML
BZE UR QL: NEGATIVE NG/ML
CODEINE UR-MCNC: NEGATIVE NG/ML
CREAT UR-MCNC: 133.4 MG/DL
DRUG SCREEN COMMENT UR-IMP: ABNORMAL
FENTANYL UR QL SCN: NEGATIVE NG/ML
HYDROCODONE UR-MCNC: NEGATIVE NG/ML
HYDROMORPHONE UR-MCNC: NEGATIVE NG/ML
MDA UR-MCNC: NORMAL UG/ML
MDEA UR-MCNC: NORMAL NG/ML
MDMA UR-MCNC: NORMAL NG/ML
METHADONE UR QL: NEGATIVE NG/ML
METHAMPHET UR-MCNC: NORMAL UG/ML
MORPHINE UR-MCNC: NEGATIVE NG/ML
NORHYDROCODONE UR CFM-MCNC: NEGATIVE NG/ML
OPIATES UR QL: ABNORMAL NG/ML
OXIDANTS UR QL: NEGATIVE MCG/ML
OXYCODONE UR QL: NEGATIVE NG/ML
PCP UR QL: NEGATIVE NG/ML
PH UR: 7 [PH] (ref 4.5–9)
PHENTERMINE UR-MCNC: NORMAL UG/ML
QUEST NOTES AND COMMENTS: ABNORMAL
THC UR QL: POSITIVE NG/ML
THC UR-MCNC: 14 NG/ML

## 2025-05-19 LAB
AMPHET UR-MCNC: NEGATIVE NG/ML
AMPHETAMINES UR QL: NEGATIVE NG/ML
BARBITURATES UR QL: NEGATIVE NG/ML
BENZODIAZ UR QL: NEGATIVE NG/ML
BZE UR QL: NEGATIVE NG/ML
CODEINE UR-MCNC: NEGATIVE NG/ML
CREAT UR-MCNC: 133.4 MG/DL
DRUG SCREEN COMMENT UR-IMP: ABNORMAL
FENTANYL UR QL SCN: NEGATIVE NG/ML
HYDROCODONE UR-MCNC: NEGATIVE NG/ML
HYDROMORPHONE UR-MCNC: NEGATIVE NG/ML
MDA UR-MCNC: NEGATIVE NG/ML
MDEA UR-MCNC: NEGATIVE NG/ML
MDMA UR-MCNC: NEGATIVE NG/ML
METHADONE UR QL: NEGATIVE NG/ML
METHAMPHET UR-MCNC: NEGATIVE NG/ML
MORPHINE UR-MCNC: NEGATIVE NG/ML
NORHYDROCODONE UR CFM-MCNC: NEGATIVE NG/ML
OPIATES UR QL: ABNORMAL NG/ML
OXIDANTS UR QL: NEGATIVE MCG/ML
OXYCODONE UR QL: NEGATIVE NG/ML
PCP UR QL: NEGATIVE NG/ML
PH UR: 7 [PH] (ref 4.5–9)
PHENTERMINE UR-MCNC: NEGATIVE NG/ML
QUEST NOTES AND COMMENTS: ABNORMAL
THC UR QL: POSITIVE NG/ML
THC UR-MCNC: 14 NG/ML

## 2025-06-11 DIAGNOSIS — F06.4 ANXIETY DISORDER DUE TO KNOWN PHYSIOLOGICAL CONDITION: ICD-10-CM

## 2025-06-11 DIAGNOSIS — F43.23 ADJUSTMENT DISORDER WITH MIXED ANXIETY AND DEPRESSED MOOD: ICD-10-CM

## 2025-06-11 RX ORDER — HYDROXYZINE HYDROCHLORIDE 10 MG/1
10 TABLET, FILM COATED ORAL 3 TIMES DAILY PRN
Qty: 90 TABLET | Refills: 2 | Status: SHIPPED | OUTPATIENT
Start: 2025-06-11 | End: 2025-09-09

## 2025-08-18 ENCOUNTER — APPOINTMENT (OUTPATIENT)
Dept: BEHAVIORAL HEALTH | Facility: CLINIC | Age: 20
End: 2025-08-18
Payer: COMMERCIAL

## 2025-08-18 ENCOUNTER — APPOINTMENT (OUTPATIENT)
Dept: PRIMARY CARE | Facility: CLINIC | Age: 20
End: 2025-08-18
Payer: COMMERCIAL

## 2025-08-18 VITALS
WEIGHT: 149 LBS | BODY MASS INDEX: 23.95 KG/M2 | HEART RATE: 79 BPM | DIASTOLIC BLOOD PRESSURE: 60 MMHG | HEIGHT: 66 IN | SYSTOLIC BLOOD PRESSURE: 90 MMHG

## 2025-08-18 DIAGNOSIS — F90.9 ATTENTION DEFICIT HYPERACTIVITY DISORDER (ADHD), UNSPECIFIED ADHD TYPE: ICD-10-CM

## 2025-08-18 DIAGNOSIS — Z00.00 HEALTHCARE MAINTENANCE: ICD-10-CM

## 2025-08-18 DIAGNOSIS — R10.84 ABDOMINAL CRAMPING, GENERALIZED: Primary | ICD-10-CM

## 2025-08-18 DIAGNOSIS — Z79.899 MEDICATION MANAGEMENT: ICD-10-CM

## 2025-08-18 DIAGNOSIS — F06.4 ANXIETY DISORDER DUE TO KNOWN PHYSIOLOGICAL CONDITION: ICD-10-CM

## 2025-08-18 PROCEDURE — 99213 OFFICE O/P EST LOW 20 MIN: CPT | Performed by: FAMILY MEDICINE

## 2025-08-18 PROCEDURE — 1036F TOBACCO NON-USER: CPT | Performed by: FAMILY MEDICINE

## 2025-08-18 PROCEDURE — 1036F TOBACCO NON-USER: CPT | Performed by: NURSE PRACTITIONER

## 2025-08-18 PROCEDURE — 3008F BODY MASS INDEX DOCD: CPT | Performed by: FAMILY MEDICINE

## 2025-08-18 PROCEDURE — 99214 OFFICE O/P EST MOD 30 MIN: CPT | Performed by: NURSE PRACTITIONER

## 2025-08-18 RX ORDER — CLINDAMYCIN PHOSPHATE 10 UG/ML
1 LOTION TOPICAL 2 TIMES DAILY
COMMUNITY
Start: 2025-05-12

## 2025-08-18 RX ORDER — DICYCLOMINE HYDROCHLORIDE 10 MG/1
10 CAPSULE ORAL 4 TIMES DAILY PRN
Qty: 30 CAPSULE | Refills: 0 | Status: SHIPPED | OUTPATIENT
Start: 2025-08-18 | End: 2025-10-17

## 2025-08-18 ASSESSMENT — ENCOUNTER SYMPTOMS
HEADACHES: 0
DIZZINESS: 0
FEVER: 0
SHORTNESS OF BREATH: 0
ACTIVITY CHANGE: 0
FATIGUE: 0

## 2025-08-18 ASSESSMENT — PATIENT HEALTH QUESTIONNAIRE - PHQ9
SUM OF ALL RESPONSES TO PHQ9 QUESTIONS 1 AND 2: 0
1. LITTLE INTEREST OR PLEASURE IN DOING THINGS: NOT AT ALL
2. FEELING DOWN, DEPRESSED OR HOPELESS: NOT AT ALL

## 2025-08-19 LAB
ALBUMIN SERPL-MCNC: 4.3 G/DL (ref 3.6–5.1)
ALP SERPL-CCNC: 11 U/L (ref 31–125)
ALT SERPL-CCNC: 8 U/L (ref 6–29)
AMYLASE SERPL-CCNC: 59 U/L (ref 21–101)
ANION GAP SERPL CALCULATED.4IONS-SCNC: 7 MMOL/L (CALC) (ref 7–17)
AST SERPL-CCNC: 13 U/L (ref 10–30)
BILIRUB SERPL-MCNC: 0.2 MG/DL (ref 0.2–1.2)
BUN SERPL-MCNC: 12 MG/DL (ref 7–25)
CALCIUM SERPL-MCNC: 9.3 MG/DL (ref 8.6–10.2)
CHLORIDE SERPL-SCNC: 102 MMOL/L (ref 98–110)
CO2 SERPL-SCNC: 28 MMOL/L (ref 20–32)
CREAT SERPL-MCNC: 0.55 MG/DL (ref 0.5–0.96)
EGFRCR SERPLBLD CKD-EPI 2021: 134 ML/MIN/1.73M2
GLUCOSE SERPL-MCNC: 84 MG/DL (ref 65–139)
LIPASE SERPL-CCNC: 54 U/L (ref 7–60)
POTASSIUM SERPL-SCNC: 3.9 MMOL/L (ref 3.5–5.3)
PROT SERPL-MCNC: 6.7 G/DL (ref 6.1–8.1)
SODIUM SERPL-SCNC: 137 MMOL/L (ref 135–146)

## 2025-10-17 ENCOUNTER — APPOINTMENT (OUTPATIENT)
Dept: BEHAVIORAL HEALTH | Facility: CLINIC | Age: 20
End: 2025-10-17
Payer: COMMERCIAL

## 2026-05-18 ENCOUNTER — APPOINTMENT (OUTPATIENT)
Dept: PRIMARY CARE | Facility: CLINIC | Age: 21
End: 2026-05-18
Payer: COMMERCIAL